# Patient Record
Sex: FEMALE | Race: WHITE | NOT HISPANIC OR LATINO | Employment: UNEMPLOYED | URBAN - METROPOLITAN AREA
[De-identification: names, ages, dates, MRNs, and addresses within clinical notes are randomized per-mention and may not be internally consistent; named-entity substitution may affect disease eponyms.]

---

## 2017-04-12 ENCOUNTER — CONVERSION ENCOUNTER (OUTPATIENT)
Dept: FAMILY MEDICINE CLINIC | Facility: CLINIC | Age: 52
End: 2017-04-12

## 2017-04-12 LAB
ALBUMIN SERPL-MCNC: 4.6 G/DL (ref 3.6–5.1)
ALBUMIN/GLOB SERPL: ABNORMAL {RATIO} (ref 1–2.5)
ALP SERPL-CCNC: 68 UNITS/L (ref 33–130)
ALT SERPL-CCNC: 12 UNITS/L (ref 6–29)
AST SERPL-CCNC: 14 UNITS/L (ref 10–35)
BACTERIA UR CULT: NORMAL
BASOPHILS # BLD AUTO: ABNORMAL 10*3/MM3 (ref 0–200)
BASOPHILS NFR BLD AUTO: 0.4 %
BILIRUB SERPL-MCNC: 0.5 MG/DL (ref 0.2–1.2)
BUN SERPL-MCNC: 12 MG/DL (ref 7–25)
BUN/CREAT SERPL: ABNORMAL (ref 6–22)
CALCIUM SERPL-MCNC: 9.7 MG/DL (ref 8.6–10.4)
CHLORIDE SERPL-SCNC: 101 MMOL/L (ref 98–110)
CHOLEST SERPL-MCNC: 170 MG/DL (ref 125–200)
CHOLEST/HDLC SERPL: ABNORMAL {RATIO}
CO2 CONTENT VENOUS: 27 MMOL/L (ref 20–31)
CONV NEUTROPHILS/100 LEUKOCYTES IN BODY FLUID BY MANUAL COUNT: 55.3 %
CONV TOTAL PROTEIN: 7.4 G/DL (ref 6.1–8.1)
CREAT UR-MCNC: 0.81 MG/DL (ref 0.5–1.05)
EOSINOPHIL # BLD AUTO: 2 %
EOSINOPHIL # BLD AUTO: ABNORMAL 10*3/MM3 (ref 15–500)
ERYTHROCYTE [DISTWIDTH] IN BLOOD BY AUTOMATED COUNT: 13.8 % (ref 11–15)
GLOBULIN UR ELPH-MCNC: ABNORMAL G/DL (ref 1.9–3.7)
GLUCOSE SERPL-MCNC: 91 MG/DL (ref 65–99)
HCT VFR BLD AUTO: 38.1 % (ref 35–45)
HDLC SERPL-MCNC: 79 MG/DL
HGB BLD-MCNC: 12.4 G/DL (ref 11.7–15.5)
LDLC SERPL CALC-MCNC: ABNORMAL MG/DL
LYMPHOCYTES # BLD AUTO: ABNORMAL 10*3/MM3 (ref 850–3900)
LYMPHOCYTES NFR BLD AUTO: 35.3 %
MCH RBC QN AUTO: 30.6 PG (ref 27–33)
MCHC RBC AUTO-ENTMCNC: ABNORMAL % (ref 32–36)
MCV RBC AUTO: 94.1 FL (ref 80–100)
MONOCYTES # BLD AUTO: ABNORMAL 10*3/MICROLITER (ref 200–950)
MONOCYTES NFR BLD AUTO: 7 %
NEUTROPHILS # BLD AUTO: ABNORMAL 10*3/MM3 (ref 1500–7800)
PLATELET # BLD AUTO: ABNORMAL 10*3/MM3 (ref 140–400)
PMV BLD AUTO: 7 FL (ref 7.5–12.5)
POTASSIUM SERPL-SCNC: 4.1 MMOL/L (ref 3.5–5.3)
RBC # BLD AUTO: ABNORMAL 10*6/MM3 (ref 3.8–5.1)
SODIUM SERPL-SCNC: 138 MMOL/L (ref 135–146)
TRIGL SERPL-MCNC: 64 MG/DL
TSH SERPL-ACNC: 2.35 MICROINTL UNITS/ML
WBC # BLD AUTO: ABNORMAL K/UL (ref 3.8–10.8)

## 2020-02-26 RX ORDER — AMLODIPINE BESYLATE 10 MG/1
TABLET ORAL
Qty: 30 TABLET | Refills: 8 | Status: SHIPPED | OUTPATIENT
Start: 2020-02-26 | End: 2020-11-24

## 2020-08-25 ENCOUNTER — OFFICE VISIT (OUTPATIENT)
Dept: FAMILY MEDICINE CLINIC | Facility: CLINIC | Age: 55
End: 2020-08-25

## 2020-08-25 ENCOUNTER — LAB (OUTPATIENT)
Dept: FAMILY MEDICINE CLINIC | Facility: CLINIC | Age: 55
End: 2020-08-25

## 2020-08-25 VITALS
RESPIRATION RATE: 10 BRPM | DIASTOLIC BLOOD PRESSURE: 97 MMHG | WEIGHT: 150.2 LBS | TEMPERATURE: 98.4 F | HEART RATE: 82 BPM | SYSTOLIC BLOOD PRESSURE: 155 MMHG

## 2020-08-25 DIAGNOSIS — R07.9 CHEST PAIN, UNSPECIFIED TYPE: ICD-10-CM

## 2020-08-25 DIAGNOSIS — E55.9 VITAMIN D DEFICIENCY: ICD-10-CM

## 2020-08-25 DIAGNOSIS — Z00.00 PHYSICAL EXAM: Primary | ICD-10-CM

## 2020-08-25 DIAGNOSIS — Z12.11 COLON CANCER SCREENING: ICD-10-CM

## 2020-08-25 DIAGNOSIS — Z60.9 HIGH RISK SOCIAL SITUATION: ICD-10-CM

## 2020-08-25 DIAGNOSIS — I10 ESSENTIAL HYPERTENSION: ICD-10-CM

## 2020-08-25 DIAGNOSIS — Z12.31 BREAST CANCER SCREENING BY MAMMOGRAM: ICD-10-CM

## 2020-08-25 DIAGNOSIS — Z20.828 EXPOSURE TO SARS-ASSOCIATED CORONAVIRUS: ICD-10-CM

## 2020-08-25 PROCEDURE — 87086 URINE CULTURE/COLONY COUNT: CPT | Performed by: FAMILY MEDICINE

## 2020-08-25 PROCEDURE — 82306 VITAMIN D 25 HYDROXY: CPT | Performed by: FAMILY MEDICINE

## 2020-08-25 PROCEDURE — 81001 URINALYSIS AUTO W/SCOPE: CPT | Performed by: FAMILY MEDICINE

## 2020-08-25 PROCEDURE — 99396 PREV VISIT EST AGE 40-64: CPT | Performed by: FAMILY MEDICINE

## 2020-08-25 PROCEDURE — 84443 ASSAY THYROID STIM HORMONE: CPT | Performed by: FAMILY MEDICINE

## 2020-08-25 PROCEDURE — 85025 COMPLETE CBC W/AUTO DIFF WBC: CPT | Performed by: FAMILY MEDICINE

## 2020-08-25 PROCEDURE — 80061 LIPID PANEL: CPT | Performed by: FAMILY MEDICINE

## 2020-08-25 PROCEDURE — 86769 SARS-COV-2 COVID-19 ANTIBODY: CPT | Performed by: FAMILY MEDICINE

## 2020-08-25 PROCEDURE — 80053 COMPREHEN METABOLIC PANEL: CPT | Performed by: FAMILY MEDICINE

## 2020-08-25 PROCEDURE — 86803 HEPATITIS C AB TEST: CPT | Performed by: FAMILY MEDICINE

## 2020-08-25 PROCEDURE — 93000 ELECTROCARDIOGRAM COMPLETE: CPT | Performed by: FAMILY MEDICINE

## 2020-08-25 RX ORDER — OMEPRAZOLE 20 MG/1
TABLET, DELAYED RELEASE ORAL
COMMUNITY
Start: 2017-04-10 | End: 2021-08-26 | Stop reason: ALTCHOICE

## 2020-08-25 RX ORDER — LOSARTAN POTASSIUM 25 MG/1
25 TABLET ORAL DAILY
Qty: 30 TABLET | Refills: 11 | Status: SHIPPED | OUTPATIENT
Start: 2020-08-25 | End: 2021-07-30 | Stop reason: SDUPTHER

## 2020-08-25 SDOH — SOCIAL STABILITY - SOCIAL INSECURITY: PROBLEM RELATED TO SOCIAL ENVIRONMENT, UNSPECIFIED: Z60.9

## 2020-08-25 NOTE — PROGRESS NOTES
Rooming Tab(CC,VS,Pt Hx,Fall Screen)  Chief Complaint   Patient presents with   • Annual Exam       Subjective 55-year-old here for physical.  The patient does not exercise and but she has active.  She is complaining of some random type chest pain worse when she sleeps on the left side and she will also get pain along the medial border of her left scapula.  When she gets her chest discomfort is on both sides, vague pain with more of a discomfort with no associated shortness of breath or nausea or vomiting or diaphoresis.  Not associated with exertion.  It is in goes away after 30 to 60 seconds.  Does not really seem to hurt more with deep breathing.  It is not going to her arm or neck.  This is been going on for several months she does have a history of hypertension.  She does not smoke and she does not have diabetes or excessively high cholesterol.  Family history is negative for heart disease at a younger age.   Patient tries to eat a nutritious diet.  She tries to avoid fast food and soft drinks.  She denies any issues with her bowel or bladder function.  She has no blood in her stool or urine.  Her blood pressure has been running somewhat high recently at home  Patient would like to be checked for COVID-19 because she felt like she was sick few months ago with COVID before it was known to be in this area    I have reviewed and updated her medications, medical history and problem list during today's office visit.     Patient Care Team:  Osman Jean Baptiste MD as PCP - General (Family Medicine)    Problem List Tab  Medications Tab  Synopsis Tab  Chart Review Tab  Care Everywhere Tab  Immunizations Tab  Patient History Tab    Social History     Tobacco Use   • Smoking status: Never Smoker   • Smokeless tobacco: Never Used   Substance Use Topics   • Alcohol use: Yes     Frequency: 2-3 times a week     Drinks per session: 3 or 4     Binge frequency: Monthly     Comment: NIGHTLY       Review of Systems   Constitutional:  Negative for chills, fatigue and fever.   HENT: Negative for nosebleeds.    Eyes: Negative for double vision.   Respiratory: Negative for chest tightness and shortness of breath.    Cardiovascular: Positive for chest pain. Negative for palpitations.   Gastrointestinal: Negative for blood in stool.   Genitourinary: Negative for dysuria and hematuria.   Musculoskeletal: Positive for back pain.   Neurological: Negative for dizziness and syncope.   Psychiatric/Behavioral: Negative for depressed mood.       Objective     Rooming Tab(CC,VS,Pt Hx,Fall Screen)  /97   Pulse 82   Temp 98.4 °F (36.9 °C)   Resp 10   Wt 68.1 kg (150 lb 3.2 oz)     There is no height or weight on file to calculate BMI.    Physical Exam   Constitutional: She is oriented to person, place, and time. She appears well-developed and well-nourished. No distress.   HENT:   Head: Normocephalic and atraumatic.   Nose: Nose normal.   Mouth/Throat: Oropharynx is clear and moist.   Eyes: Pupils are equal, round, and reactive to light. Conjunctivae, EOM and lids are normal.   Neck: Trachea normal and normal range of motion. Neck supple. No JVD present. Carotid bruit is not present. No thyroid mass and no thyromegaly present.   No carotid bruits   Cardiovascular: Normal rate, regular rhythm, normal heart sounds and intact distal pulses.   Pulmonary/Chest: Effort normal and breath sounds normal.   Abdominal: Soft. Bowel sounds are normal. She exhibits no distension. There is no tenderness. There is no guarding.   Genitourinary:   Genitourinary Comments: Breast exam normal   Musculoskeletal:   No c/c/e   Neurological: She is alert and oriented to person, place, and time. No cranial nerve deficit.   Skin: Skin is warm and dry.   She has a chronic area of hyperpigmentation on her right upper quadrant of her abdomen   Psychiatric: She has a normal mood and affect. Her speech is normal and behavior is normal. She is attentive.   Nursing note and vitals  reviewed.       Statin Choice Calculator  Data Reviewed:        ECG 12 Lead  Date/Time: 8/25/2020 2:19 PM  Performed by: Osman Jaen Baptiste MD  Authorized by: Osman Jean Baptiste MD   Comparison: not compared with previous ECG   Previous ECG: no previous ECG available  Rhythm: sinus rhythm  Rate: normal  Conduction: conduction normal  ST Segments: ST segments normal  T Waves: T waves normal  QRS axis: normal  Other: no other findings    Clinical impression: normal ECG                    Lab Results   Component Value Date    BUN 9 08/25/2020    CREATININE 0.76 08/25/2020    EGFRIFNONA 79 08/25/2020     08/25/2020    K 3.8 08/25/2020    CL 99 08/25/2020    CALCIUM 10.1 08/25/2020    ALBUMIN 5.00 08/25/2020    BILITOT 0.3 08/25/2020    ALKPHOS 69 08/25/2020    AST 22 08/25/2020    ALT 21 08/25/2020    TRIG 118 08/25/2020    HDL 69 (H) 08/25/2020    VLDL 23.6 08/25/2020    LDL 92 08/25/2020    LDLHDL 1.34 08/25/2020    WBC 7.19 08/25/2020    RBC 4.48 08/25/2020    HCT 42.3 08/25/2020    MCV 94.4 08/25/2020    MCH 31.7 08/25/2020    TSH 2.620 08/25/2020    EBQK63XR 32.0 08/25/2020      Assessment/Plan   Order Review Tab  Health Maintenance Tab  Patient Plan/Order Tab  Diagnoses and all orders for this visit:    1. Physical exam (Primary)  Assessment & Plan:  Recommend routine aerobic exercise for cardiovascular health once we clear her heart.  She also could consider muscle toning and weightbearing exercise for bone strength.  Nutritious diet high in fiber and vegetable, low carbohydrate, eating lean cuts of meat higher amounts of fish.  Immunizations were discussed with the patient.    Orders:  -     CBC & Differential  -     Comprehensive Metabolic Panel  -     Lipid Panel  -     TSH  -     Urinalysis With Culture If Indicated - Urine, Clean Catch  -     CBC Auto Differential  -     Urinalysis, Microscopic Only - Urine, Clean Catch; Future  -     Urinalysis, Microscopic Only - Urine, Clean Catch  -     Urine Culture  - Urine,; Future  -     Urine Culture - Urine, Urine, Clean Catch    2. Breast cancer screening by mammogram  -     Mammo Screening Digital Tomosynthesis Bilateral With CAD; Future    3. Colon cancer screening  -     Ambulatory Referral For Screening Colonoscopy    4. Chest pain, unspecified type  Assessment & Plan:  Does not sound cardiac however it is somewhat vague.  We will go ahead and do a chest x-ray and an EKG and a stress treadmill.  I think some of her discomfort in her medial border of her left scapula is related to her pole of her large breast that can be causing some of her chest discomfort as well.  We discussed ways to try to improve this    Orders:  -     ECG 12 Lead  -     XR Chest PA & Lateral; Future  -     Treadmill Stress Test; Future    5. Vitamin D deficiency  -     Vitamin D 25 Hydroxy    6. High risk social situation  -     Hepatitis C Antibody    7. Exposure to SARS-associated coronavirus  -     SARS-CoV-2 Antibodies (Roche)    8. Essential hypertension  Assessment & Plan:  Hypertension is worsening.  Dietary sodium restriction.  Weight loss.  Regular aerobic exercise.  Medication changes per orders.  Blood pressure will be reassessed at the next regular appointment.  Add losartan 25 mg daily      Other orders  -     losartan (Cozaar) 25 MG tablet; Take 1 tablet by mouth Daily.  Dispense: 30 tablet; Refill: 11      Wrapup Tab  Return in about 6 weeks (around 10/6/2020) for Recheck.

## 2020-08-26 PROBLEM — R07.9 CHEST PAIN: Status: ACTIVE | Noted: 2020-08-26

## 2020-08-26 LAB
25(OH)D3 SERPL-MCNC: 32 NG/ML (ref 30–100)
ALBUMIN SERPL-MCNC: 5 G/DL (ref 3.5–5.2)
ALBUMIN/GLOB SERPL: 1.7 G/DL
ALP SERPL-CCNC: 69 U/L (ref 39–117)
ALT SERPL W P-5'-P-CCNC: 21 U/L (ref 1–33)
ANION GAP SERPL CALCULATED.3IONS-SCNC: 13.1 MMOL/L (ref 5–15)
AST SERPL-CCNC: 22 U/L (ref 1–32)
BACTERIA UR QL AUTO: ABNORMAL /HPF
BASOPHILS # BLD AUTO: 0.05 10*3/MM3 (ref 0–0.2)
BASOPHILS NFR BLD AUTO: 0.7 % (ref 0–1.5)
BILIRUB SERPL-MCNC: 0.3 MG/DL (ref 0–1.2)
BILIRUB UR QL STRIP: NEGATIVE
BUN SERPL-MCNC: 9 MG/DL (ref 6–20)
BUN/CREAT SERPL: 11.8 (ref 7–25)
CALCIUM SPEC-SCNC: 10.1 MG/DL (ref 8.6–10.5)
CHLORIDE SERPL-SCNC: 99 MMOL/L (ref 98–107)
CHOLEST SERPL-MCNC: 185 MG/DL (ref 0–200)
CLARITY UR: ABNORMAL
CO2 SERPL-SCNC: 24.9 MMOL/L (ref 22–29)
COLOR UR: YELLOW
CREAT SERPL-MCNC: 0.76 MG/DL (ref 0.57–1)
DEPRECATED RDW RBC AUTO: 46 FL (ref 37–54)
EOSINOPHIL # BLD AUTO: 0.06 10*3/MM3 (ref 0–0.4)
EOSINOPHIL NFR BLD AUTO: 0.8 % (ref 0.3–6.2)
ERYTHROCYTE [DISTWIDTH] IN BLOOD BY AUTOMATED COUNT: 13.2 % (ref 12.3–15.4)
GFR SERPL CREATININE-BSD FRML MDRD: 79 ML/MIN/1.73
GLOBULIN UR ELPH-MCNC: 2.9 GM/DL
GLUCOSE SERPL-MCNC: 88 MG/DL (ref 65–99)
GLUCOSE UR STRIP-MCNC: NEGATIVE MG/DL
HCT VFR BLD AUTO: 42.3 % (ref 34–46.6)
HCV AB SER DONR QL: NORMAL
HDLC SERPL-MCNC: 69 MG/DL (ref 40–60)
HGB BLD-MCNC: 14.2 G/DL (ref 12–15.9)
HGB UR QL STRIP.AUTO: NEGATIVE
HYALINE CASTS UR QL AUTO: ABNORMAL /LPF
IMM GRANULOCYTES # BLD AUTO: 0.02 10*3/MM3 (ref 0–0.05)
IMM GRANULOCYTES NFR BLD AUTO: 0.3 % (ref 0–0.5)
KETONES UR QL STRIP: NEGATIVE
LDLC SERPL CALC-MCNC: 92 MG/DL (ref 0–100)
LDLC/HDLC SERPL: 1.34 {RATIO}
LEUKOCYTE ESTERASE UR QL STRIP.AUTO: ABNORMAL
LYMPHOCYTES # BLD AUTO: 3.51 10*3/MM3 (ref 0.7–3.1)
LYMPHOCYTES NFR BLD AUTO: 48.8 % (ref 19.6–45.3)
MCH RBC QN AUTO: 31.7 PG (ref 26.6–33)
MCHC RBC AUTO-ENTMCNC: 33.6 G/DL (ref 31.5–35.7)
MCV RBC AUTO: 94.4 FL (ref 79–97)
MONOCYTES # BLD AUTO: 0.54 10*3/MM3 (ref 0.1–0.9)
MONOCYTES NFR BLD AUTO: 7.5 % (ref 5–12)
NEUTROPHILS NFR BLD AUTO: 3.01 10*3/MM3 (ref 1.7–7)
NEUTROPHILS NFR BLD AUTO: 41.9 % (ref 42.7–76)
NITRITE UR QL STRIP: NEGATIVE
NRBC BLD AUTO-RTO: 0 /100 WBC (ref 0–0.2)
PH UR STRIP.AUTO: 6.5 [PH] (ref 5–8)
PLATELET # BLD AUTO: 386 10*3/MM3 (ref 140–450)
PMV BLD AUTO: 8.9 FL (ref 6–12)
POTASSIUM SERPL-SCNC: 3.8 MMOL/L (ref 3.5–5.2)
PROT SERPL-MCNC: 7.9 G/DL (ref 6–8.5)
PROT UR QL STRIP: NEGATIVE
RBC # BLD AUTO: 4.48 10*6/MM3 (ref 3.77–5.28)
RBC # UR: ABNORMAL /HPF
REF LAB TEST METHOD: ABNORMAL
SODIUM SERPL-SCNC: 137 MMOL/L (ref 136–145)
SP GR UR STRIP: <=1.005 (ref 1–1.03)
SQUAMOUS #/AREA URNS HPF: ABNORMAL /HPF
TRIGL SERPL-MCNC: 118 MG/DL (ref 0–150)
TSH SERPL DL<=0.05 MIU/L-ACNC: 2.62 UIU/ML (ref 0.27–4.2)
UROBILINOGEN UR QL STRIP: ABNORMAL
VLDLC SERPL-MCNC: 23.6 MG/DL (ref 5–40)
WBC # BLD AUTO: 7.19 10*3/MM3 (ref 3.4–10.8)
WBC UR QL AUTO: ABNORMAL /HPF

## 2020-08-26 NOTE — ASSESSMENT & PLAN NOTE
Recommend routine aerobic exercise for cardiovascular health once we clear her heart.  She also could consider muscle toning and weightbearing exercise for bone strength.  Nutritious diet high in fiber and vegetable, low carbohydrate, eating lean cuts of meat higher amounts of fish.  Immunizations were discussed with the patient.

## 2020-08-26 NOTE — ASSESSMENT & PLAN NOTE
Hypertension is worsening.  Dietary sodium restriction.  Weight loss.  Regular aerobic exercise.  Medication changes per orders.  Blood pressure will be reassessed at the next regular appointment.  Add losartan 25 mg daily

## 2020-08-26 NOTE — ASSESSMENT & PLAN NOTE
Does not sound cardiac however it is somewhat vague.  We will go ahead and do a chest x-ray and an EKG and a stress treadmill.  I think some of her discomfort in her medial border of her left scapula is related to her pole of her large breast that can be causing some of her chest discomfort as well.  We discussed ways to try to improve this   Alert-The patient is alert, awake and responds to voice. The patient is oriented to time, place, and person. The triage nurse is able to obtain subjective information.

## 2020-08-27 LAB
BACTERIA SPEC AEROBE CULT: NORMAL
SARS-COV-2 AB SERPL QL IA: NEGATIVE

## 2020-08-31 ENCOUNTER — HOSPITAL ENCOUNTER (OUTPATIENT)
Dept: RESPIRATORY THERAPY | Facility: HOSPITAL | Age: 55
Discharge: HOME | End: 2020-08-31
Admitting: FAMILY MEDICINE

## 2020-08-31 DIAGNOSIS — R07.9 CHEST PAIN, UNSPECIFIED TYPE: ICD-10-CM

## 2020-08-31 DIAGNOSIS — R07.9 CHEST PAIN, UNSPECIFIED TYPE: Primary | ICD-10-CM

## 2020-08-31 DIAGNOSIS — Z12.31 BREAST CANCER SCREENING BY MAMMOGRAM: ICD-10-CM

## 2020-08-31 LAB
BH CV STRESS BP STAGE 1: NORMAL
BH CV STRESS BP STAGE 2: NORMAL
BH CV STRESS BP STAGE 3: NORMAL
BH CV STRESS DURATION MIN STAGE 1: 3
BH CV STRESS DURATION MIN STAGE 2: 3
BH CV STRESS DURATION MIN STAGE 3: 3
BH CV STRESS DURATION SEC STAGE 1: 0
BH CV STRESS DURATION SEC STAGE 2: 0
BH CV STRESS DURATION SEC STAGE 3: 0
BH CV STRESS GRADE STAGE 1: 10
BH CV STRESS GRADE STAGE 2: 12
BH CV STRESS GRADE STAGE 3: 14
BH CV STRESS HR STAGE 1: 121
BH CV STRESS HR STAGE 2: 152
BH CV STRESS HR STAGE 3: 160
BH CV STRESS METS STAGE 1: 4.6
BH CV STRESS METS STAGE 2: 7
BH CV STRESS METS STAGE 3: 10.1
BH CV STRESS PROTOCOL 1: NORMAL
BH CV STRESS RECOVERY BP: NORMAL MMHG
BH CV STRESS RECOVERY HR: 98 BPM
BH CV STRESS SPEED STAGE 1: 1.7
BH CV STRESS SPEED STAGE 2: 2.5
BH CV STRESS SPEED STAGE 3: 3.4
BH CV STRESS STAGE 1: 1
BH CV STRESS STAGE 2: 2
BH CV STRESS STAGE 3: 3
MAXIMAL PREDICTED HEART RATE: 164 BPM
PERCENT MAX PREDICTED HR: 97.56 %
STRESS BASELINE BP: NORMAL MMHG
STRESS BASELINE HR: 76 BPM
STRESS PERCENT HR: 115 %
STRESS POST ESTIMATED WORKLOAD: 10.1 METS
STRESS POST EXERCISE DUR MIN: 7 MIN
STRESS POST PEAK BP: NORMAL MMHG
STRESS POST PEAK HR: 160 BPM
STRESS TARGET HR: 139 BPM

## 2020-08-31 PROCEDURE — 93018 CV STRESS TEST I&R ONLY: CPT | Performed by: NURSE PRACTITIONER

## 2020-08-31 PROCEDURE — 93017 CV STRESS TEST TRACING ONLY: CPT

## 2020-09-17 DIAGNOSIS — R07.9 CHEST PAIN, UNSPECIFIED TYPE: ICD-10-CM

## 2020-09-23 DIAGNOSIS — R16.0 LIVER MASSES: Primary | ICD-10-CM

## 2020-10-02 DIAGNOSIS — R16.0 LIVER MASSES: ICD-10-CM

## 2020-10-02 DIAGNOSIS — M54.12 CERVICAL RADICULOPATHY: Primary | ICD-10-CM

## 2020-10-13 ENCOUNTER — TREATMENT (OUTPATIENT)
Dept: PHYSICAL THERAPY | Facility: CLINIC | Age: 55
End: 2020-10-13

## 2020-10-13 DIAGNOSIS — M54.12 RADICULOPATHY, CERVICAL: ICD-10-CM

## 2020-10-13 DIAGNOSIS — M54.6 THORACIC SPINE PAIN: Primary | ICD-10-CM

## 2020-10-13 PROCEDURE — 97161 PT EVAL LOW COMPLEX 20 MIN: CPT | Performed by: PHYSICAL THERAPIST

## 2020-10-13 PROCEDURE — 97140 MANUAL THERAPY 1/> REGIONS: CPT | Performed by: PHYSICAL THERAPIST

## 2020-10-13 PROCEDURE — 97110 THERAPEUTIC EXERCISES: CPT | Performed by: PHYSICAL THERAPIST

## 2020-10-13 NOTE — PROGRESS NOTES
"Physical Therapy Initial Evaluation and Plan of Care    Patient: Latonya Lowe   : 1965  Diagnosis/ICD-10 Code:  Low back pain, unspecified back pain laterality, unspecified chronicity, unspecified whether sciatica present [M54.5]  Referring practitioner: Osman Jean Baptiste MD  Date of Initial Visit: 10/13/2020  Today's Date: 10/13/2020  Patient seen for 1 sessions           Subjective Questionnaire:  NDI 8%      Subjective 54 yo female with c/o L parascapular pain. Pt reports long history of pain with insidious onset. Symptoms did not improve with time. Pt with episodes of global L UE numbness/tingling. Symptoms worsen when lying supine and \"pushing\" movements. Symptoms improve with movement. Further alleviating and exacerbating factors are unknown. 1/10 pain now and 5-6/10 at worst.  MD follow up: 10/21/20  Social hx: Unemployed      Objective          Active Range of Motion   Cervical/Thoracic Spine   Cervical    Flexion: WFL  Extension: WFL  Left lateral flexion: WFL  Right lateral flexion: WFL  Left rotation: with pain  Right rotation: WFL    Additional Active Range of Motion Details  Moderately limited with left rotation.    UEs wfl.    Strength/Myotome Testing   Cervical Spine     Left   Normal strength    Right   Normal strength    Left Shoulder     Isolated Muscles   Serratus anterior: 4-     Additional Strength Details  Pain provocation with serratus anterior MMT on the left.    Tests   Cervical     Left   Positive cervical distraction and Spurling's sign.       Further provocative testing is negative.     Assessment & Plan     Assessment  Impairments: abnormal or restricted ROM, activity intolerance, impaired physical strength, lacks appropriate home exercise program and pain with function  Assessment details: 54 yo female with c/o L parascapular pain. Pt is with a good response to treatment this date and would benefit from further evaluation and treatment to address the above " impairments.    Prognosis: good  Functional Limitations: lifting, pulling, pushing, uncomfortable because of pain, moving in bed and unable to perform repetitive tasks  Goals  Plan Goals: Short term goals, 1 week: Tolerate HEP progression.  Voice compliance with activity modification.  Report improvement in symptoms.    LTGs, 6 weeks: Cspine AROM to be wfl and non provocative.  Symptoms to be centralized.  5/5 SA strength and without pain provocation with testing.  Improve NDI to 2%.  Report no to little difficulty lying supine.   Independent with HEP.    Plan  Therapy options: will be seen for skilled physical therapy services  Other planned modality interventions: modalities prn  Planned therapy interventions: flexibility, body mechanics training, functional ROM exercises, home exercise program, manual therapy, neuromuscular re-education, postural training, strengthening, stretching and therapeutic activities  Frequency: 1-2 times per week.  Duration in visits: 10  Treatment plan discussed with: patient      Timed:         Manual Therapy:    15     mins  58240;     Therapeutic Exercise:    10     mins  39265;     Neuromuscular Sarah Beth:        mins  17688;    Therapeutic Activity:          mins  55475;     Gait Training:           mins  91297;     Ultrasound:          mins  71111;    Ionto                                   mins   19612  Self Care                            mins   59228    Un-Timed:  Electrical Stimulation:         mins  90160 ( );  Traction          mins 79498  Low Eval     15     Mins  97203  Mod Eval          Mins  59050  High Eval                            Mins  36420  Re-Eval                               mins  13105        Timed Treatment:   25   mins   Total Treatment:     40   mins    PT SIGNATURE: Johnson Buenrostro PT, DPT, OCS  [place] license: [id number]  DATE TREATMENT INITIATED: 10/13/2020    Initial Certification  Certification Period: 1/11/2021  I certify that the therapy services are  furnished while this patient is under my care.  The services outlined above are required for this patient and will be reviewed every 90 days.     PHYSICIAN: _____________________________    Osman Jean Baptiste MD      DATE:     Please sign and return via fax to [phone]. Thank you, [facility].

## 2020-10-20 ENCOUNTER — TREATMENT (OUTPATIENT)
Dept: PHYSICAL THERAPY | Facility: CLINIC | Age: 55
End: 2020-10-20

## 2020-10-20 DIAGNOSIS — M54.12 RADICULOPATHY, CERVICAL: Primary | ICD-10-CM

## 2020-10-20 DIAGNOSIS — M54.6 THORACIC SPINE PAIN: ICD-10-CM

## 2020-10-20 PROCEDURE — 97110 THERAPEUTIC EXERCISES: CPT | Performed by: PHYSICAL THERAPIST

## 2020-10-20 PROCEDURE — 97012 MECHANICAL TRACTION THERAPY: CPT | Performed by: PHYSICAL THERAPIST

## 2020-10-20 NOTE — PROGRESS NOTES
Physical Therapy Daily Progress Note  Latonya Lowe   1965   Primary Diagnosis: Radiculopathy, cervical [M54.12]   Type: THERAPY  Noted: 10/13/2020   10/20/2020   Visits: 2       Subjective   Pt reports: Pain intensity improving but still fairly consistent. HEP going well.      Objective     See Exercise, Manual, and Modality Logs for complete treatment.     Patient Education: HEP    Assessment/Plan Tolerating progression well.       Progress per Plan of Care            Timed:         Manual Therapy:         mins  67465;     Therapeutic Exercise:    23     mins  95578;     Neuromuscular Sarah Beth:        mins  10856;    Therapeutic Activity:          mins  62311;     Gait Training:           mins  61364;     Ultrasound:          mins  44369;    Ionto                                   mins   20787  Self Care                            mins   73101    Un-Timed:  Electrical Stimulation:         mins  99524 ( );  Traction     15     mins 78055  Low Eval          Mins  36474  Mod Eval          Mins  01402  High Eval                            Mins  31816  Re-Eval                               mins  36455    Timed Treatment:   23   mins   Total Treatment:     38   mins    Johnson Buenrostro, PT, DPT, OCS  [place] license: [id number]  Physical Therapist

## 2020-10-21 ENCOUNTER — OFFICE VISIT (OUTPATIENT)
Dept: FAMILY MEDICINE CLINIC | Facility: CLINIC | Age: 55
End: 2020-10-21

## 2020-10-21 VITALS
SYSTOLIC BLOOD PRESSURE: 118 MMHG | OXYGEN SATURATION: 94 % | DIASTOLIC BLOOD PRESSURE: 80 MMHG | WEIGHT: 148 LBS | TEMPERATURE: 96.8 F | RESPIRATION RATE: 16 BRPM | HEART RATE: 82 BPM

## 2020-10-21 DIAGNOSIS — M54.6 ACUTE LEFT-SIDED THORACIC BACK PAIN: ICD-10-CM

## 2020-10-21 DIAGNOSIS — I10 ESSENTIAL HYPERTENSION: Primary | ICD-10-CM

## 2020-10-21 PROCEDURE — 99213 OFFICE O/P EST LOW 20 MIN: CPT | Performed by: FAMILY MEDICINE

## 2020-10-21 NOTE — PROGRESS NOTES
Rooming Tab(CC,VS,Pt Hx,Fall Screen)  Chief Complaint   Patient presents with   • Hypertension       Subjective Patient is here for follow-up of her hypertension.  She has had normal blood pressure.  We added losartan 25 mg daily because of her blood pressure being elevated she is tolerating it.  She has no dizziness or syncope or chest pain.  Patient is getting physical therapy for left-sided back pain and is helping.  She had a CT scan of the chest which was negative but had to have an MRI of her abdomen for questionable liver lesion which ended up being negative as well.  She is feeling well overall.    I have reviewed and updated her medications, medical history and problem list during today's office visit.     Patient Care Team:  Osman Jean Baptiste MD as PCP - General (Family Medicine)    Problem List Tab  Medications Tab  Synopsis Tab  Chart Review Tab  Care Everywhere Tab  Immunizations Tab  Patient History Tab    Social History     Tobacco Use   • Smoking status: Never Smoker   • Smokeless tobacco: Never Used   Substance Use Topics   • Alcohol use: Yes     Frequency: 2-3 times a week     Drinks per session: 3 or 4     Binge frequency: Monthly     Comment: NIGHTLY       Review of Systems   Constitutional: Negative for chills, fatigue and fever.   HENT: Negative for congestion and nosebleeds.    Eyes: Negative for double vision.   Respiratory: Negative for chest tightness and shortness of breath.    Cardiovascular: Negative for chest pain and palpitations.   Gastrointestinal: Negative for blood in stool.   Genitourinary: Negative for dysuria and hematuria.   Musculoskeletal: Positive for back pain.   Neurological: Negative for dizziness and syncope.   Psychiatric/Behavioral: Negative for depressed mood.       Objective     Rooming Tab(CC,VS,Pt Hx,Fall Screen)  /80 (BP Location: Right arm)   Pulse 82   Temp 96.8 °F (36 °C)   Resp 16   Wt 67.1 kg (148 lb)   SpO2 94%     There is no height or weight on  file to calculate BMI.    Physical Exam  Vitals signs and nursing note reviewed.   Constitutional:       General: She is not in acute distress.     Appearance: She is well-developed.   HENT:      Head: Normocephalic and atraumatic.      Right Ear: Tympanic membrane and ear canal normal.      Left Ear: Tympanic membrane and ear canal normal.      Nose: Nose normal.      Mouth/Throat:      Mouth: Mucous membranes are moist.      Pharynx: Oropharynx is clear.   Eyes:      General: Lids are normal.      Extraocular Movements: Extraocular movements intact.      Conjunctiva/sclera: Conjunctivae normal.      Pupils: Pupils are equal, round, and reactive to light.   Neck:      Musculoskeletal: Normal range of motion and neck supple.      Thyroid: No thyroid mass or thyromegaly.      Vascular: No carotid bruit or JVD.      Trachea: Trachea normal.      Comments: No carotid bruits  Cardiovascular:      Rate and Rhythm: Normal rate and regular rhythm.      Heart sounds: Normal heart sounds.   Pulmonary:      Effort: Pulmonary effort is normal.      Breath sounds: Normal breath sounds.   Musculoskeletal:      Comments: No c/c/e   Skin:     General: Skin is warm and dry.   Neurological:      Mental Status: She is alert and oriented to person, place, and time.      Cranial Nerves: No cranial nerve deficit.   Psychiatric:         Attention and Perception: She is attentive.         Speech: Speech normal.         Behavior: Behavior normal.          Statin Choice Calculator  Data Reviewed:               Lab Results   Component Value Date    BUN 9 08/25/2020    CREATININE 0.76 08/25/2020    EGFRIFNONA 79 08/25/2020     08/25/2020    K 3.8 08/25/2020    CL 99 08/25/2020    CALCIUM 10.1 08/25/2020    ALBUMIN 5.00 08/25/2020    BILITOT 0.3 08/25/2020    ALKPHOS 69 08/25/2020    AST 22 08/25/2020    ALT 21 08/25/2020    TRIG 118 08/25/2020    HDL 69 (H) 08/25/2020    VLDL 23.6 08/25/2020    LDL 92 08/25/2020    LDLHDL 1.34  08/25/2020    WBC 7.19 08/25/2020    RBC 4.48 08/25/2020    HCT 42.3 08/25/2020    MCV 94.4 08/25/2020    MCH 31.7 08/25/2020    TSH 2.620 08/25/2020    HKEF13GB 32.0 08/25/2020      Assessment/Plan   Order Review Tab  Health Maintenance Tab  Patient Plan/Order Tab  Diagnoses and all orders for this visit:    1. Essential hypertension (Primary)  Assessment & Plan:  Hypertension is improving with treatment.  Continue current treatment regimen.  Dietary sodium restriction.  Regular aerobic exercise.  Continue current medications.  Blood pressure will be reassessed at the next regular appointment.      2. Acute left-sided thoracic back pain  Assessment & Plan:  Continue physical therapy.  Seems to be improving.        Wrapup Tab  Return in about 44 weeks (around 8/25/2021) for Annual physical.

## 2020-10-22 ENCOUNTER — TREATMENT (OUTPATIENT)
Dept: PHYSICAL THERAPY | Facility: CLINIC | Age: 55
End: 2020-10-22

## 2020-10-22 DIAGNOSIS — M54.6 THORACIC SPINE PAIN: ICD-10-CM

## 2020-10-22 DIAGNOSIS — M54.12 RADICULOPATHY, CERVICAL: Primary | ICD-10-CM

## 2020-10-22 PROCEDURE — 97110 THERAPEUTIC EXERCISES: CPT | Performed by: PHYSICAL THERAPIST

## 2020-10-22 PROCEDURE — 97012 MECHANICAL TRACTION THERAPY: CPT | Performed by: PHYSICAL THERAPIST

## 2020-10-22 NOTE — PROGRESS NOTES
Physical Therapy Daily Progress Note  Latonya Lowe   1965   Primary Diagnosis: Radiculopathy, cervical [M54.12]   Type: THERAPY  Noted: 10/13/2020   10/22/2020   Visits: 3       Subjective   Pt reports: Scapular pain improving. HEP going well      Objective     See Exercise, Manual, and Modality Logs for complete treatment.     Patient Education: HEP    Assessment/Plan Symptoms continue to centralize.       Progress per Plan of Care            Timed:         Manual Therapy:         mins  09893;     Therapeutic Exercise:    10     mins  61868;     Neuromuscular Sarah Beth:        mins  79052;    Therapeutic Activity:          mins  19324;     Gait Training:           mins  73867;     Ultrasound:          mins  15783;    Ionto                                   mins   22987  Self Care                            mins   63938    Un-Timed:  Electrical Stimulation:         mins  92958 ( );  Traction     15     mins 46565  Low Eval          Mins  48503  Mod Eval          Mins  37641  High Eval                            Mins  24054  Re-Eval                               mins  30648    Timed Treatment:   10   mins   Total Treatment:     25   mins    Johnson Buenrostro, PT, DPT, OCS  [place] license: [id number]  Physical Therapist

## 2020-10-23 PROBLEM — M54.6 ACUTE LEFT-SIDED THORACIC BACK PAIN: Status: ACTIVE | Noted: 2020-10-23

## 2020-10-27 ENCOUNTER — TREATMENT (OUTPATIENT)
Dept: PHYSICAL THERAPY | Facility: CLINIC | Age: 55
End: 2020-10-27

## 2020-10-27 DIAGNOSIS — M54.12 RADICULOPATHY, CERVICAL: Primary | ICD-10-CM

## 2020-10-27 DIAGNOSIS — M54.6 THORACIC SPINE PAIN: ICD-10-CM

## 2020-10-27 PROCEDURE — 97012 MECHANICAL TRACTION THERAPY: CPT | Performed by: PHYSICAL THERAPIST

## 2020-10-27 NOTE — PROGRESS NOTES
Physical Therapy Daily Progress Note  Latonya Lowe   1965   Primary Diagnosis: Radiculopathy, cervical [M54.12]   Type: THERAPY  Noted: 10/13/2020   10/27/2020   Visits: 4       Subjective   Pt reports: Scapula pain improving. HEP going well.      Objective     See Exercise, Manual, and Modality Logs for complete treatment.     Patient Education: HEP    Assessment/Plan Symptoms continuing to improve. Doing well with HEP.      Progress per Plan of Care            Timed:         Manual Therapy:         mins  20845;     Therapeutic Exercise:         mins  01136;     Neuromuscular Sarah Beth:        mins  41696;    Therapeutic Activity:          mins  21071;     Gait Training:           mins  38339;     Ultrasound:          mins  75627;    Ionto                                   mins   58019  Self Care                            mins   40901    Un-Timed:  Electrical Stimulation:         mins  31613 ( );  Traction      15    mins 97330  Low Eval          Mins  37189  Mod Eval          Mins  88292  High Eval                            Mins  00359  Re-Eval                               mins  50132    Timed Treatment:   0   mins   Total Treatment:     15   mins    Johnson Buenrostro, PT, DPT, OCS  [place] license: [id number]  Physical Therapist

## 2020-10-29 ENCOUNTER — TREATMENT (OUTPATIENT)
Dept: PHYSICAL THERAPY | Facility: CLINIC | Age: 55
End: 2020-10-29

## 2020-10-29 DIAGNOSIS — M54.6 THORACIC SPINE PAIN: ICD-10-CM

## 2020-10-29 DIAGNOSIS — M54.12 RADICULOPATHY, CERVICAL: Primary | ICD-10-CM

## 2020-10-29 PROCEDURE — 97012 MECHANICAL TRACTION THERAPY: CPT | Performed by: PHYSICAL THERAPIST

## 2020-10-29 PROCEDURE — 97110 THERAPEUTIC EXERCISES: CPT | Performed by: PHYSICAL THERAPIST

## 2020-10-29 NOTE — PROGRESS NOTES
Physical Therapy Daily Progress Note  Latonya Lowe   1965   Primary Diagnosis: Radiculopathy, cervical [M54.12]   Type: THERAPY  Noted: 10/13/2020   10/29/2020   Visits: 5       Subjective   Pt reports: Pt feeling near prior level of function. HEP going well. Pt wants to try independent HEP before proceeding with more treatment here.      Objective     See Exercise, Manual, and Modality Logs for complete treatment.     Patient Education: HEP    Assessment/Plan Pt to continue with 2-3 weeks independent HEP then return here if necessary. Will DC if this goes well.      Progress per Plan of Care            Timed:         Manual Therapy:         mins  72160;     Therapeutic Exercise:    8     mins  49336;     Neuromuscular Sarah Beth:        mins  50996;    Therapeutic Activity:          mins  09307;     Gait Training:           mins  08494;     Ultrasound:          mins  60229;    Ionto                                   mins   62731  Self Care                            mins   04393    Un-Timed:  Electrical Stimulation:         mins  56711 ( );  Traction     15     mins 47513  Low Eval          Mins  75034  Mod Eval          Mins  16151  High Eval                            Mins  91044  Re-Eval                               mins  11300    Timed Treatment:   8   mins   Total Treatment:     23   mins    Johnson Buenrostro, PT, DPT, OCS  [place] license: [id number]  Physical Therapist

## 2020-11-24 RX ORDER — AMLODIPINE BESYLATE 10 MG/1
TABLET ORAL
Qty: 30 TABLET | Refills: 7 | Status: SHIPPED | OUTPATIENT
Start: 2020-11-24 | End: 2021-08-02 | Stop reason: SDUPTHER

## 2021-06-14 ENCOUNTER — TREATMENT (OUTPATIENT)
Dept: PHYSICAL THERAPY | Facility: CLINIC | Age: 56
End: 2021-06-14

## 2021-06-14 DIAGNOSIS — M70.62 TROCHANTERIC BURSITIS OF LEFT HIP: ICD-10-CM

## 2021-06-14 DIAGNOSIS — M25.552 PAIN IN LEFT HIP: Primary | ICD-10-CM

## 2021-06-14 PROCEDURE — 97140 MANUAL THERAPY 1/> REGIONS: CPT | Performed by: PHYSICAL THERAPIST

## 2021-06-14 PROCEDURE — 97110 THERAPEUTIC EXERCISES: CPT | Performed by: PHYSICAL THERAPIST

## 2021-06-14 PROCEDURE — 97161 PT EVAL LOW COMPLEX 20 MIN: CPT | Performed by: PHYSICAL THERAPIST

## 2021-06-14 NOTE — PROGRESS NOTES
Physical Therapy Initial Evaluation and Plan of Care    Patient: Latoyna Lowe   : 1965  Diagnosis/ICD-10 Code:  Pain in left hip [M25.552]  Referring practitioner: Self Referring    Subjective Evaluation    History of Present Illness  Mechanism of injury: 56 year old female with reports of L hip/ low back pain starting 1 month ago. Reports pain occurs with walking, stairs, and WB activities. Reports feeling weak in B LE, but only pain occurs in L hip. Reports some mild pain during the night that affects her ability to sleep.       Patient Occupation:  Pain  Current pain ratin  At best pain ratin  At worst pain ratin  Location: L posterior and lateral hip   Quality: dull ache and sharp  Relieving factors: medications  Aggravating factors: standing, stairs, sleeping and ambulation    Diagnostic Tests  No diagnostic tests performed    Treatments  Previous treatment: physical therapy  Patient Goals  Patient goals for therapy: decreased pain, increased strength, independence with ADLs/IADLs and increased motion         Subjective Questionnaire: oxford hip: 33    Objective          Palpation   Left   Tenderness of the piriformis and TFL.     Tenderness     Left Hip   Tenderness in the greater trochanter.     Lumbar Screen  Lumbar range of motion within normal limits.    Neurological Testing     Sensation     Hip   Left Hip   Intact: light touch    Right Hip   Intact: light touch    Active Range of Motion   Left Hip   Normal active range of motion    Right Hip   Normal active range of motion    Strength/Myotome Testing     Left Hip   Planes of Motion   Flexion: 4-  Extension: 4-  Abduction: 4-  Adduction: 4-    Right Hip   Planes of Motion   Flexion: 4  Extension: 4  Abduction: 4  Adduction: 4    Left Knee   Normal strength    Right Knee   Normal strength    Additional Strength Details  Lower abdominals 3+/5    Tests     Left Hip   Negative GERARDO, FADIR, Luci, piriformis, scour, SI compression  and SI distraction.   Modified Silviano: Negative.   SLR: Negative.     Left Hip Flexibility Comments:   WFL    Right Hip Flexibility Comments:   WFL          Assessment & Plan     Assessment  Impairments: abnormal or restricted ROM, impaired physical strength, lacks appropriate home exercise program and pain with function  Assessment details: 56 year old female with reports of L hip/ low back pain starting 1 month ago. Reports pain occurs with walking, stairs, and WB activities. Reports feeling weak in B LE, but only pain occurs in L hip. Reports some mild pain during the night that affects her ability to sleep. Pt presents to skilled PT with L hip pain, TTP over GTB,  weakness of R hip mm. Pt would benefit from skilled PT to address the above findings and improve pt's ease with functional mobility and return to PLOF.    Barriers to therapy: None  Prognosis: good  Functional Limitations: lifting, sleeping, walking, sitting, standing and unable to perform repetitive tasks  Goals  Plan Goals: ST.Pt will report reduction in worst pain level to <4/10  in 2 weeks.  2. Pt will demo good tolerance and compliance with HEP in 2 weeks.  3. Pt will report 50% improved tolerance to standing and walking activities in 2 weeks.    LT.Pt will be independent with HEP in 6 weeks for self management and prevention of re-occurrence.   2.Pt will improve strength of R hip to grossly 4+/5 in 6 weeks.  3. Pt will improve lower abdominal strength to grossly 4/5 in 6 weeks.  4.Pt will report reduction in worst pain level to 2/10 in 6 weeks.  5. Pt will improve oxford hip by 10 points in 6 weeks.    Plan  Therapy options: will be seen for skilled physical therapy services  Planned modality interventions: cryotherapy, thermotherapy (hydrocollator packs), electrical stimulation/Russian stimulation and traction  Planned therapy interventions: abdominal trunk stabilization, body mechanics training, flexibility, functional ROM exercises,  home exercise program, manual therapy, neuromuscular re-education, postural training, soft tissue mobilization, spinal/joint mobilization, strengthening, stretching and therapeutic activities  Frequency: 2x week  Duration in weeks: 6  Treatment plan discussed with: patient            Timed:  Manual Therapy:    15     mins  42121;  Therapeutic Exercise:    15     mins  09724;       Untimed:  Low Eval                        15     Mins  [id number]      Timed Treatment:   30   mins   Total Treatment:     45   mins    PT SIGNATURE: Eneida Pineda, PT       DATE TREATMENT INITIATED: 6/14/2021    Initial Certification  Certification Period: 9/12/2021  I certify that the therapy services are furnished while this patient is under my care.  The services outlined above are required by this patient, and will be reviewed every 90 days.     PHYSICIAN: Referring, Self      DATE:     Please sign and return via fax to [phone].. Thank you, [facility].

## 2021-07-30 RX ORDER — LOSARTAN POTASSIUM 25 MG/1
25 TABLET ORAL DAILY
Qty: 30 TABLET | Refills: 1 | Status: SHIPPED | OUTPATIENT
Start: 2021-07-30 | End: 2021-08-26 | Stop reason: SDUPTHER

## 2021-07-30 NOTE — TELEPHONE ENCOUNTER
Patient is requesting a refill for losartan. Patient would like this sent to [facility].    Patient states she is going out of town today and will need this for her trip.     [facility] patient transferring to [facility]. Informed she needs to make an appt to get est with [facility].

## 2021-08-02 RX ORDER — AMLODIPINE BESYLATE 10 MG/1
10 TABLET ORAL DAILY
Qty: 30 TABLET | Refills: 0 | Status: SHIPPED | OUTPATIENT
Start: 2021-08-02 | End: 2021-08-26 | Stop reason: SDUPTHER

## 2021-08-02 NOTE — TELEPHONE ENCOUNTER
Caller: Latonya Lowe    Relationship: Self    Best call back number: [phone]    Medication needed:   Requested Prescriptions     Pending Prescriptions Disp Refills   • amLODIPine (NORVASC) 10 MG tablet 30 tablet 7     Sig: Take 1 tablet by mouth Daily.       When do you need the refill by: 8/2/21    What additional details did the patient provide when requesting the medication: COMPLETELY OUT. [facility] M[facility]AGE THRU LAST WEEK AND NOTHING WAS SEEN. WOULD LIKE A 30 DAY PRESCRIPTION, SHE WILL CALL AND  MAKE AN APPOINTMENT FOR MED REFILLS     Does the patient have less than a 3 day supply:  [x] Yes  [] No    What is the patient's preferred pharmacy: [facility] [place] [address] - [phone] [facility] [phone]

## 2021-08-26 ENCOUNTER — OFFICE VISIT (OUTPATIENT)
Dept: FAMILY MEDICINE CLINIC | Facility: CLINIC | Age: 56
End: 2021-08-26

## 2021-08-26 ENCOUNTER — LAB (OUTPATIENT)
Dept: FAMILY MEDICINE CLINIC | Facility: CLINIC | Age: 56
End: 2021-08-26

## 2021-08-26 VITALS
HEART RATE: 73 BPM | HEIGHT: 64 IN | BODY MASS INDEX: 25.61 KG/M2 | RESPIRATION RATE: 16 BRPM | SYSTOLIC BLOOD PRESSURE: 122 MMHG | OXYGEN SATURATION: 97 % | WEIGHT: 150 LBS | DIASTOLIC BLOOD PRESSURE: 84 MMHG

## 2021-08-26 DIAGNOSIS — Z13.220 SCREENING, LIPID: ICD-10-CM

## 2021-08-26 DIAGNOSIS — Z12.11 SCREENING FOR COLON CANCER: ICD-10-CM

## 2021-08-26 DIAGNOSIS — I10 ESSENTIAL HYPERTENSION: ICD-10-CM

## 2021-08-26 DIAGNOSIS — Z00.00 ANNUAL PHYSICAL EXAM: Primary | ICD-10-CM

## 2021-08-26 DIAGNOSIS — K21.00 GASTROESOPHAGEAL REFLUX DISEASE WITH ESOPHAGITIS WITHOUT HEMORRHAGE: ICD-10-CM

## 2021-08-26 DIAGNOSIS — Z00.00 ANNUAL PHYSICAL EXAM: ICD-10-CM

## 2021-08-26 DIAGNOSIS — Z12.31 ENCOUNTER FOR SCREENING MAMMOGRAM FOR MALIGNANT NEOPLASM OF BREAST: ICD-10-CM

## 2021-08-26 LAB
25(OH)D3 SERPL-MCNC: 42.4 NG/ML
ALBUMIN SERPL-MCNC: 5 G/DL (ref 3.5–5.2)
ALBUMIN/GLOB SERPL: 1.8 G/DL
ALP SERPL-CCNC: 66 U/L (ref 39–117)
ALT SERPL W P-5'-P-CCNC: 18 U/L (ref 1–33)
ANION GAP SERPL CALCULATED.3IONS-SCNC: 12.8 MMOL/L (ref 5–15)
AST SERPL-CCNC: 21 U/L (ref 1–32)
BASOPHILS # BLD AUTO: 0.06 10*3/MM3 (ref 0–0.2)
BASOPHILS NFR BLD AUTO: 0.8 % (ref 0–1.5)
BILIRUB SERPL-MCNC: 0.4 MG/DL (ref 0–1.2)
BUN SERPL-MCNC: 15 MG/DL (ref 6–20)
BUN/CREAT SERPL: 15.8 (ref 7–25)
CALCIUM SPEC-SCNC: 9.6 MG/DL (ref 8.6–10.5)
CHLORIDE SERPL-SCNC: 101 MMOL/L (ref 98–107)
CHOLEST SERPL-MCNC: 184 MG/DL (ref 0–200)
CO2 SERPL-SCNC: 24.2 MMOL/L (ref 22–29)
CREAT SERPL-MCNC: 0.95 MG/DL (ref 0.57–1)
DEPRECATED RDW RBC AUTO: 44.8 FL (ref 37–54)
EOSINOPHIL # BLD AUTO: 0.14 10*3/MM3 (ref 0–0.4)
EOSINOPHIL NFR BLD AUTO: 2 % (ref 0.3–6.2)
ERYTHROCYTE [DISTWIDTH] IN BLOOD BY AUTOMATED COUNT: 13.1 % (ref 12.3–15.4)
GFR SERPL CREATININE-BSD FRML MDRD: 61 ML/MIN/1.73
GLOBULIN UR ELPH-MCNC: 2.8 GM/DL
GLUCOSE SERPL-MCNC: 94 MG/DL (ref 65–99)
HCT VFR BLD AUTO: 42.4 % (ref 34–46.6)
HDLC SERPL-MCNC: 80 MG/DL (ref 40–60)
HGB BLD-MCNC: 14 G/DL (ref 12–15.9)
IMM GRANULOCYTES # BLD AUTO: 0.02 10*3/MM3 (ref 0–0.05)
IMM GRANULOCYTES NFR BLD AUTO: 0.3 % (ref 0–0.5)
LDLC SERPL CALC-MCNC: 87 MG/DL (ref 0–100)
LDLC/HDLC SERPL: 1.06 {RATIO}
LYMPHOCYTES # BLD AUTO: 2.92 10*3/MM3 (ref 0.7–3.1)
LYMPHOCYTES NFR BLD AUTO: 41.1 % (ref 19.6–45.3)
MCH RBC QN AUTO: 31.3 PG (ref 26.6–33)
MCHC RBC AUTO-ENTMCNC: 33 G/DL (ref 31.5–35.7)
MCV RBC AUTO: 94.6 FL (ref 79–97)
MONOCYTES # BLD AUTO: 0.51 10*3/MM3 (ref 0.1–0.9)
MONOCYTES NFR BLD AUTO: 7.2 % (ref 5–12)
NEUTROPHILS NFR BLD AUTO: 3.45 10*3/MM3 (ref 1.7–7)
NEUTROPHILS NFR BLD AUTO: 48.6 % (ref 42.7–76)
NRBC BLD AUTO-RTO: 0.1 /100 WBC (ref 0–0.2)
PLATELET # BLD AUTO: 396 10*3/MM3 (ref 140–450)
PMV BLD AUTO: 8.6 FL (ref 6–12)
POTASSIUM SERPL-SCNC: 4.2 MMOL/L (ref 3.5–5.2)
PROT SERPL-MCNC: 7.8 G/DL (ref 6–8.5)
RBC # BLD AUTO: 4.48 10*6/MM3 (ref 3.77–5.28)
SODIUM SERPL-SCNC: 138 MMOL/L (ref 136–145)
TRIGL SERPL-MCNC: 98 MG/DL (ref 0–150)
VLDLC SERPL-MCNC: 17 MG/DL (ref 5–40)
WBC # BLD AUTO: 7.1 10*3/MM3 (ref 3.4–10.8)

## 2021-08-26 PROCEDURE — 36415 COLL VENOUS BLD VENIPUNCTURE: CPT

## 2021-08-26 PROCEDURE — 82306 VITAMIN D 25 HYDROXY: CPT | Performed by: NURSE PRACTITIONER

## 2021-08-26 PROCEDURE — 80061 LIPID PANEL: CPT | Performed by: NURSE PRACTITIONER

## 2021-08-26 PROCEDURE — 85025 COMPLETE CBC W/AUTO DIFF WBC: CPT | Performed by: NURSE PRACTITIONER

## 2021-08-26 PROCEDURE — 80053 COMPREHEN METABOLIC PANEL: CPT | Performed by: NURSE PRACTITIONER

## 2021-08-26 PROCEDURE — 99386 PREV VISIT NEW AGE 40-64: CPT | Performed by: NURSE PRACTITIONER

## 2021-08-26 RX ORDER — OMEPRAZOLE 20 MG/1
TABLET, DELAYED RELEASE ORAL
Status: CANCELLED | OUTPATIENT
Start: 2021-08-26

## 2021-08-26 RX ORDER — AMLODIPINE BESYLATE 10 MG/1
10 TABLET ORAL DAILY
Qty: 90 TABLET | Refills: 1 | Status: SHIPPED | OUTPATIENT
Start: 2021-08-26

## 2021-08-26 RX ORDER — LOSARTAN POTASSIUM 25 MG/1
25 TABLET ORAL DAILY
Qty: 90 TABLET | Refills: 1 | Status: SHIPPED | OUTPATIENT
Start: 2021-08-26

## 2021-08-26 RX ORDER — FAMOTIDINE 20 MG/1
20 TABLET, FILM COATED ORAL 2 TIMES DAILY PRN
Qty: 60 TABLET | Refills: 1 | Status: SHIPPED | OUTPATIENT
Start: 2021-08-26

## 2021-08-26 NOTE — PATIENT INSTRUCTIONS
Gastroesophageal Reflux Disease, Adult  Gastroesophageal reflux (FAUZIA) happens when acid from the stomach flows up into the tube that connects the mouth and the stomach (esophagus). Normally, food travels down the esophagus and stays in the stomach to be digested. However, when a person has FAUZIA, food and stomach acid sometimes move back up into the esophagus. If this becomes a more serious problem, the person may be diagnosed with a disease called gastroesophageal reflux disease (GERD). GERD occurs when the reflux:  · Happens often.  · Causes frequent or severe symptoms.  · Causes problems such as damage to the esophagus.  When stomach acid comes in contact with the esophagus, the acid may cause soreness (inflammation) in the esophagus. Over time, GERD may create small holes (ulcers) in the lining of the esophagus.  What are the causes?  This condition is caused by a problem with the muscle between the esophagus and the stomach (lower esophageal sphincter, or LES). Normally, the LES muscle closes after food passes through the esophagus to the stomach. When the LES is weakened or abnormal, it does not close properly, and that allows food and stomach acid to go back up into the esophagus.  The LES can be weakened by certain dietary substances, medicines, and medical conditions, including:  · Tobacco use.  · Pregnancy.  · Having a hiatal hernia.  · Alcohol use.  · Certain foods and beverages, such as coffee, chocolate, onions, and peppermint.  What increases the risk?  You are more likely to develop this condition if you:  · Have an increased body weight.  · Have a connective tissue disorder.  · Use NSAID medicines.  What are the signs or symptoms?  Symptoms of this condition include:  · Heartburn.  · Difficult or painful swallowing.  · The feeling of having a lump in the throat.  · A bitter taste in the mouth.  · Bad breath.  · Having a large amount of saliva.  · Having an upset or bloated  stomach.  · Belching.  · Chest pain. Different conditions can cause chest pain. Make sure you see your health care provider if you experience chest pain.  · Shortness of breath or wheezing.  · Ongoing (chronic) cough or a night-time cough.  · Wearing away of tooth enamel.  · Weight loss.  How is this diagnosed?  Your health care provider will take a medical history and perform a physical exam. To determine if you have mild or severe GERD, your health care provider may also monitor how you respond to treatment. You may also have tests, including:  · A test to examine your stomach and esophagus with a small camera (endoscopy).  · A test that measures the acidity level in your esophagus.  · A test that measures how much pressure is on your esophagus.  · A barium swallow or modified barium swallow test to show the shape, size, and functioning of your esophagus.  How is this treated?  The goal of treatment is to help relieve your symptoms and to prevent complications. Treatment for this condition may vary depending on how severe your symptoms are. Your health care provider may recommend:  · Changes to your diet.  · Medicine.  · Surgery.  Follow these instructions at home:  Eating and drinking    · Follow a diet as recommended by your health care provider. This may involve avoiding foods and drinks such as:  ? Coffee and tea (with or without caffeine).  ? Drinks that contain alcohol.  ? Energy drinks and sports drinks.  ? Carbonated drinks or sodas.  ? Chocolate and cocoa.  ? Peppermint and mint flavorings.  ? Garlic and onions.  ? Horseradish.  ? Spicy and acidic foods, including peppers, chili powder, cevallos powder, vinegar, hot sauces, and barbecue sauce.  ? Citrus fruit juices and citrus fruits, such as oranges, jake, and limes.  ? Tomato-based foods, such as red sauce, chili, salsa, and pizza with red sauce.  ? Fried and fatty foods, such as donuts, french fries, potato chips, and high-fat dressings.  ? High-fat  meats, such as hot dogs and fatty cuts of red and white meats, such as rib eye steak, sausage, ham, and daigle.  ? High-fat dairy items, such as whole milk, butter, and cream cheese.  · Eat small, frequent meals instead of large meals.  · Avoid drinking large amounts of liquid with your meals.  · Avoid eating meals during the 2-3 hours before bedtime.  · Avoid lying down right after you eat.  · Do not exercise right after you eat.  Lifestyle    · Do not use any products that contain nicotine or tobacco, such as cigarettes, e-cigarettes, and chewing tobacco. If you need help quitting, ask your health care provider.  · Try to reduce your stress by using methods such as yoga or meditation. If you need help reducing stress, ask your health care provider.  · If you are overweight, reduce your weight to an amount that is healthy for you. Ask your health care provider for guidance about a safe weight loss goal.  General instructions  · Pay attention to any changes in your symptoms.  · Take over-the-counter and prescription medicines only as told by your health care provider. Do not take aspirin, ibuprofen, or other NSAIDs unless your health care provider told you to do so.  · Wear loose-fitting clothing. Do not wear anything tight around your waist that causes pressure on your abdomen.  · Raise (elevate) the head of your bed about 6 inches (15 cm).  · Avoid bending over if this makes your symptoms worse.  · Keep all follow-up visits as told by your health care provider. This is important.  Contact a health care provider if:  · You have:  ? New symptoms.  ? Unexplained weight loss.  ? Difficulty swallowing or it hurts to swallow.  ? Wheezing or a persistent cough.  ? A hoarse voice.  · Your symptoms do not improve with treatment.  Get help right away if you:  · Have pain in your arms, neck, jaw, teeth, or back.  · Feel sweaty, dizzy, or light-headed.  · Have chest pain or shortness of breath.  · Vomit and your vomit looks  like blood or coffee grounds.  · Faint.  · Have stool that is bloody or black.  · Cannot swallow, drink, or eat.  Summary  · Gastroesophageal reflux happens when acid from the stomach flows up into the esophagus. GERD is a disease in which the reflux happens often, causes frequent or severe symptoms, or causes problems such as damage to the esophagus.  · Treatment for this condition may vary depending on how severe your symptoms are. Your health care provider may recommend diet and lifestyle changes, medicine, or surgery.  · Contact a health care provider if you have new or worsening symptoms.  · Take over-the-counter and prescription medicines only as told by your health care provider. Do not take aspirin, ibuprofen, or other NSAIDs unless your health care provider told you to do so.  · Keep all follow-up visits as told by your health care provider. This is important.  This information is not intended to replace advice given to you by your health care provider. Make sure you discuss any questions you have with your health care provider.  Document Revised: 06/26/2019 Document Reviewed: 06/26/2019  Elsevier Patient Education © 2021 [employer] Inc[employer]

## 2021-08-26 NOTE — PROGRESS NOTES
"Chief Complaint  Annual Exam    Subjective          Latonya Lowe presents to [facility]  History of Present Illness  New patient to me is here today for annual exam, needs medicine refills  Has chronic heartburn, taking prilosec, tells me that if she stops the prilosec, the heartburn is a problem  wishing to discontinue that as a daily medicine, will try pepcid, recommend diet modifications    Also with h/o htn, bp is a little elevated initially today 134/86, recheck is 122/84  Denies any c/o chest pain, soa, edema, dizziness, weakness, headaches    Is active, but does not exercising regularly  Eats a healthy, well balanced diet  Is fasting for labs this morning    Due for colonoscopy and mammogram    Review of Systems   HENT: Negative.    Respiratory: Negative.    Gastrointestinal: Negative.    Genitourinary: Negative.    Musculoskeletal: Positive for arthralgias.        Bilateral knee pain   Allergic/Immunologic: Positive for environmental allergies.   Neurological: Negative.      C/o having some intermittent b/l knee pain, questions whether is arthritis, tends to be bothersome more towards the end of the day, declines work-up for this at this time, will return if problem persists.    Objective   Vital Signs:   /84 (BP Location: Right arm, Patient Position: Sitting)   Pulse 73   Resp 16   Ht 162.6 cm (64\")   Wt 68 kg (150 lb)   SpO2 97%   BMI 25.75 kg/m²     Physical Exam  Vitals reviewed.   Constitutional:       Appearance: Normal appearance.   HENT:      Right Ear: Tympanic membrane and ear canal normal.      Left Ear: Tympanic membrane and ear canal normal.      Nose: Nose normal.      Mouth/Throat:      Mouth: Mucous membranes are moist.      Pharynx: Oropharynx is clear.   Eyes:      Extraocular Movements: Extraocular movements intact.      Pupils: Pupils are equal, round, and reactive to light.   Neck:      Vascular: No carotid bruit.   Cardiovascular:      Rate " and Rhythm: Normal rate and regular rhythm.      Pulses: Normal pulses.      Heart sounds: Normal heart sounds.   Pulmonary:      Effort: Pulmonary effort is normal.      Breath sounds: Normal breath sounds.   Abdominal:      General: Bowel sounds are normal.      Palpations: Abdomen is soft. There is no mass.      Tenderness: There is no abdominal tenderness.   Musculoskeletal:         General: Normal range of motion.      Cervical back: Neck supple. No tenderness.   Lymphadenopathy:      Cervical: No cervical adenopathy.   Skin:     General: Skin is warm.      Capillary Refill: Capillary refill takes less than 2 seconds.   Neurological:      Mental Status: She is alert and oriented to person, place, and time.        Result Review :                 Assessment and Plan    Diagnoses and all orders for this visit:    1. Annual physical exam (Primary)  -     CBC w AUTO Differential; Future  -     Vitamin D 25 hydroxy; Future    2. Essential hypertension  -     amLODIPine (NORVASC) 10 MG tablet; Take 1 tablet by mouth Daily.  Dispense: 90 tablet; Refill: 1  -     losartan (Cozaar) 25 MG tablet; Take 1 tablet by mouth Daily.  Dispense: 90 tablet; Refill: 1  -     Comprehensive metabolic panel; Future    3. Gastroesophageal reflux disease with esophagitis without hemorrhage  -     famotidine (Pepcid) 20 MG tablet; Take 1 tablet by mouth 2 (Two) Times a Day As Needed for Heartburn.  Dispense: 60 tablet; Refill: 1    4. Screening, lipid  -     Lipid panel; Future    5. Screening for colon cancer  -     Ambulatory Referral For Screening Colonoscopy    6. Encounter for screening mammogram for malignant neoplasm of breast  -     Mammo Screening Digital Tomosynthesis Bilateral With CAD; Future    Other orders  -     Cancel: omeprazole OTC (PrilOSEC OTC) 20 MG EC tablet        Follow Up   Return in about 6 months (around 2/26/2022) for Recheck blood pressure.  Patient was given instructions and counseling regarding her condition  or for health maintenance advice. Please see specific information pulled into the AVS if appropriate.

## 2022-03-09 ENCOUNTER — OFFICE (AMBULATORY)
Dept: URBAN - METROPOLITAN AREA PATHOLOGY 4 | Facility: PATHOLOGY | Age: 57
End: 2022-03-09
Payer: COMMERCIAL

## 2022-03-09 ENCOUNTER — ON CAMPUS - OUTPATIENT (AMBULATORY)
Dept: URBAN - METROPOLITAN AREA HOSPITAL 2 | Facility: HOSPITAL | Age: 57
End: 2022-03-09

## 2022-03-09 ENCOUNTER — OFFICE (AMBULATORY)
Dept: URBAN - METROPOLITAN AREA PATHOLOGY 4 | Facility: PATHOLOGY | Age: 57
End: 2022-03-09

## 2022-03-09 VITALS
SYSTOLIC BLOOD PRESSURE: 96 MMHG | DIASTOLIC BLOOD PRESSURE: 70 MMHG | DIASTOLIC BLOOD PRESSURE: 82 MMHG | HEART RATE: 79 BPM | SYSTOLIC BLOOD PRESSURE: 114 MMHG | HEART RATE: 62 BPM | RESPIRATION RATE: 12 BRPM | HEART RATE: 98 BPM | OXYGEN SATURATION: 99 % | TEMPERATURE: 96.8 F | SYSTOLIC BLOOD PRESSURE: 100 MMHG | OXYGEN SATURATION: 100 % | HEART RATE: 113 BPM | DIASTOLIC BLOOD PRESSURE: 91 MMHG | DIASTOLIC BLOOD PRESSURE: 74 MMHG | OXYGEN SATURATION: 98 % | HEART RATE: 94 BPM | OXYGEN SATURATION: 97 % | SYSTOLIC BLOOD PRESSURE: 102 MMHG | HEART RATE: 82 BPM | HEART RATE: 106 BPM | HEIGHT: 64 IN | SYSTOLIC BLOOD PRESSURE: 126 MMHG | OXYGEN SATURATION: 96 % | DIASTOLIC BLOOD PRESSURE: 92 MMHG | SYSTOLIC BLOOD PRESSURE: 99 MMHG | HEART RATE: 77 BPM | DIASTOLIC BLOOD PRESSURE: 57 MMHG | SYSTOLIC BLOOD PRESSURE: 112 MMHG | SYSTOLIC BLOOD PRESSURE: 101 MMHG | SYSTOLIC BLOOD PRESSURE: 116 MMHG | DIASTOLIC BLOOD PRESSURE: 99 MMHG | RESPIRATION RATE: 18 BRPM | HEART RATE: 96 BPM | HEART RATE: 76 BPM | RESPIRATION RATE: 16 BRPM | DIASTOLIC BLOOD PRESSURE: 73 MMHG | SYSTOLIC BLOOD PRESSURE: 121 MMHG | HEART RATE: 90 BPM | WEIGHT: 142 LBS | DIASTOLIC BLOOD PRESSURE: 66 MMHG | HEART RATE: 102 BPM

## 2022-03-09 DIAGNOSIS — D12.0 BENIGN NEOPLASM OF CECUM: ICD-10-CM

## 2022-03-09 DIAGNOSIS — Z12.11 ENCOUNTER FOR SCREENING FOR MALIGNANT NEOPLASM OF COLON: ICD-10-CM

## 2022-03-09 LAB
GI HISTOLOGY: A. UNSPECIFIED: (no result)
GI HISTOLOGY: PDF REPORT: (no result)

## 2022-03-09 PROCEDURE — 45385 COLONOSCOPY W/LESION REMOVAL: CPT | Mod: 33 | Performed by: INTERNAL MEDICINE

## 2022-03-09 PROCEDURE — 88305 TISSUE EXAM BY PATHOLOGIST: CPT | Mod: 26 | Performed by: INTERNAL MEDICINE

## 2022-04-21 ENCOUNTER — DOCUMENTATION (OUTPATIENT)
Dept: PHYSICAL THERAPY | Facility: CLINIC | Age: 57
End: 2022-04-21

## 2022-04-21 DIAGNOSIS — M25.552 PAIN IN LEFT HIP: Primary | ICD-10-CM

## 2022-04-21 DIAGNOSIS — M70.62 TROCHANTERIC BURSITIS OF LEFT HIP: ICD-10-CM

## 2022-04-21 NOTE — PROGRESS NOTES
Discharge Summary  Discharge Summary from Physical Therapy Report    Patient: Latonya Lowe   : 1965  Diagnosis/ICD-10 Code:  Pain in left hip [M25.552]  Referring practitioner: No ref. provider found  Date of Initial Visit: 2022  Today's Date: 2022  Patient was seen for Visit count could not be calculated. Make sure you are using a visit which is associated with an episode. sessions         Discharge Status of Patient: See Progress Note dated 2021    Goals: Not Met    Discharge Plan: Continue with current home exercise program as instructed    Comments Pt did not return following IE.           Eneida Pineda, PT  Physical Therapist  PT license: [id number]

## 2022-08-01 ENCOUNTER — APPOINTMENT (OUTPATIENT)
Dept: GENERAL RADIOLOGY | Facility: HOSPITAL | Age: 57
End: 2022-08-01

## 2022-08-01 ENCOUNTER — HOSPITAL ENCOUNTER (EMERGENCY)
Facility: HOSPITAL | Age: 57
Discharge: HOME | End: 2022-08-01
Attending: EMERGENCY MEDICINE | Admitting: EMERGENCY MEDICINE

## 2022-08-01 VITALS
WEIGHT: 151.01 LBS | BODY MASS INDEX: 25.78 KG/M2 | RESPIRATION RATE: 18 BRPM | SYSTOLIC BLOOD PRESSURE: 135 MMHG | DIASTOLIC BLOOD PRESSURE: 84 MMHG | TEMPERATURE: 98 F | OXYGEN SATURATION: 98 % | HEIGHT: 64 IN | HEART RATE: 75 BPM
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
## 2022-08-01 DIAGNOSIS — S80.11XA CONTUSION OF RIGHT LOWER EXTREMITY, INITIAL ENCOUNTER: ICD-10-CM

## 2022-08-01 DIAGNOSIS — S42.451A CLOSED DISPLACED FRACTURE OF LATERAL CONDYLE OF RIGHT HUMERUS, INITIAL ENCOUNTER: Primary | ICD-10-CM

## 2022-08-01 PROCEDURE — 73070 X-RAY EXAM OF ELBOW: CPT

## 2022-08-01 PROCEDURE — 99283 EMERGENCY DEPT VISIT LOW MDM: CPT

## 2022-08-01 PROCEDURE — 73552 X-RAY EXAM OF FEMUR 2/>: CPT

## 2022-08-01 RX ORDER — HYDROCODONE BITARTRATE AND ACETAMINOPHEN 7.5; 325 MG/1; MG/1
1 TABLET ORAL ONCE
Status: COMPLETED | OUTPATIENT
Start: 2022-08-01 | End: 2022-08-01

## 2022-08-01 RX ORDER — HYDROCODONE BITARTRATE AND ACETAMINOPHEN 7.5; 325 MG/1; MG/1
1-2 TABLET ORAL EVERY 6 HOURS PRN
Qty: 15 TABLET | Refills: 0 | Status: SHIPPED | OUTPATIENT
Start: 2022-08-01

## 2022-08-01 RX ADMIN — HYDROCODONE BITARTRATE AND ACETAMINOPHEN 1 TABLET: 7.5; 325 TABLET ORAL at 13:54

## 2022-08-01 NOTE — ED NOTES
"Pt upset.  Registration is having issues with insurance crossing over.  Someone registered her earlier and insurance did not cross over which alerted Med Assist to come see her about insurance.  Pt offended stating \"you all are shunning me because you think I don't have insurance, I am in pain and that doctor needs to come in and tell me what is going on\".  I listened to her complaints and validated her concerns.  I advised her that I would notify the md to come in and talk to her.  I explained to her that we had several emergent patient which tied up the physicians time and apologized.  I asked her if I could get her anything or do anything for her since I had the time.  Pt stated no.   at bedside and explained to pt that it looked really busy out there.    "

## 2022-08-01 NOTE — ED PROVIDER NOTES
Subjective   Patient is a 57-year-old female who had slipped and fallen today.  She complains of pain to her left elbow and her right thigh.  She denies other complaint of injury.          Review of Systems  Negative for head pain neck pain chest pain shortness of breath abdominal pain back pain numbness tingling or other complaint of injury  Past Medical History:   Diagnosis Date   • Cervical dysplasia    • GERD (gastroesophageal reflux disease)    • History of varicose veins    • Hypertension        No Known Allergies    Past Surgical History:   Procedure Laterality Date   •  SECTION     • ECTOPIC PREGNANCY SURGERY     • HYSTERECTOMY      CERVICAL DYSPLASIA  SEES DR MOODY (GYN)       Family History   Problem Relation Age of Onset   • Hip fracture Mother    • Esophageal cancer Father    • Hyperlipidemia Father    • Esophageal cancer Other    • Hypertension Other        Social History     Socioeconomic History   • Marital status:    Tobacco Use   • Smoking status: Never Smoker   • Smokeless tobacco: Never Used   Vaping Use   • Vaping Use: Never used   Substance and Sexual Activity   • Alcohol use: Yes     Alcohol/week: 10.0 standard drinks     Types: 10 Glasses of wine per week     Comment: NIGHTLY   • Drug use: Never   • Sexual activity: Yes     Partners: Male     Birth control/protection: Post-menopausal           Objective   Physical Exam  HEENT exam is no point tenderness.  Neck is nontender.  Lungs clear.  Chest nontender.  Ab soft nontender.  Back has no spinous tenderness.  Extremities M shows pain and swelling throughout her left elbow.  She also is mild pain to palpation of her right posterior thigh.  Chest 2+ pulses throughout and is neurovascularly intact.  Procedures           ED Course          XR ELBOW 2 VIEW LEFT    Result Date: 2022  Displaced lateral humeral condylar fracture with the joint effusion. No gross elbow joint dislocation.  Electronically Signed By-Mervat Canas MD  On:8/1/2022 12:20 PM This report was finalized on [id number] by  Mervat Canas MD.    XR Femur 2 View Right    Result Date: 8/1/2022  Normal 2 views of the right femur.  Electronically Signed By-Mervat Canas MD On:8/1/2022 12:18 PM This report was finalized on [id number] by  Mervat Canas MD.                                      MDM  Number of Diagnoses or Management Options  Diagnosis management comments: Patient is findings consistent with a displaced lateral humeral condyle fracture.  Her right femur x-ray is normal.  Patient will be placed in a sling.  I did speak to the on-call orthopedic surgeon.  Patient will be discharged to follow-up with him in the office.       Amount and/or Complexity of Data Reviewed  Tests in the radiology section of CPT®: reviewed    Risk of Complications, Morbidity, and/or Mortality  Presenting problems: high  Diagnostic procedures: high  Management options: high    Patient Progress  Patient progress: stable      Final diagnoses:   Closed displaced fracture of lateral condyle of right humerus, initial encounter   Contusion of right lower extremity, initial encounter   Fall, initial encounter       ED Disposition  ED Disposition     ED Disposition   Discharge    Condition   Stable    Comment   --             No follow-up provider specified.       Medication List      New Prescriptions    HYDROcodone-acetaminophen 7.5-325 MG per tablet  Commonly known as: NORCO  Take 1-2 tablets by mouth Every 6 (Six) Hours As Needed for Moderate Pain .           Where to Get Your Medications      These medications were sent to [facility], [place] - [address] [facility]  - [phone]  - [phone] FX  [address] [place] [address] [place] [place] [address]    Phone: [phone]   · HYDROcodone-acetaminophen 7.5-325 MG per tablet          Chuck Contreras MD  08/01/22 [id number]

## 2022-08-02 ENCOUNTER — TRANSCRIBE ORDERS (OUTPATIENT)
Dept: ADMINISTRATIVE | Facility: HOSPITAL | Age: 57
End: 2022-08-02

## 2022-08-02 ENCOUNTER — ANESTHESIA EVENT (OUTPATIENT)
Dept: PERIOP | Facility: HOSPITAL | Age: 57
End: 2022-08-02

## 2022-08-02 ENCOUNTER — HOSPITAL ENCOUNTER (OUTPATIENT)
Dept: CARDIOLOGY | Facility: HOSPITAL | Age: 57
Discharge: HOME | End: 2022-08-02

## 2022-08-02 ENCOUNTER — LAB (OUTPATIENT)
Dept: LAB | Facility: HOSPITAL | Age: 57
End: 2022-08-02

## 2022-08-02 DIAGNOSIS — Z01.818 PREOP TESTING: ICD-10-CM

## 2022-08-02 DIAGNOSIS — Z01.818 PREOP TESTING: Primary | ICD-10-CM

## 2022-08-02 LAB
ANION GAP SERPL CALCULATED.3IONS-SCNC: 9.3 MMOL/L (ref 5–15)
BACTERIA UR QL AUTO: ABNORMAL /HPF
BASOPHILS # BLD AUTO: 0.05 10*3/MM3 (ref 0–0.2)
BASOPHILS NFR BLD AUTO: 0.6 % (ref 0–1.5)
BILIRUB UR QL STRIP: NEGATIVE
BUN SERPL-MCNC: 11 MG/DL (ref 6–20)
BUN/CREAT SERPL: 15.1 (ref 7–25)
CALCIUM SPEC-SCNC: 8.8 MG/DL (ref 8.6–10.5)
CHLORIDE SERPL-SCNC: 96 MMOL/L (ref 98–107)
CLARITY UR: CLEAR
CO2 SERPL-SCNC: 25.7 MMOL/L (ref 22–29)
COLOR UR: YELLOW
CREAT SERPL-MCNC: 0.73 MG/DL (ref 0.57–1)
DEPRECATED RDW RBC AUTO: 41.5 FL (ref 37–54)
EGFRCR SERPLBLD CKD-EPI 2021: 95.5 ML/MIN/1.73
EOSINOPHIL # BLD AUTO: 0.13 10*3/MM3 (ref 0–0.4)
EOSINOPHIL NFR BLD AUTO: 1.5 % (ref 0.3–6.2)
ERYTHROCYTE [DISTWIDTH] IN BLOOD BY AUTOMATED COUNT: 12.9 % (ref 12.3–15.4)
GLUCOSE SERPL-MCNC: 88 MG/DL (ref 65–99)
GLUCOSE UR STRIP-MCNC: NEGATIVE MG/DL
HCT VFR BLD AUTO: 33.9 % (ref 34–46.6)
HGB BLD-MCNC: 11.5 G/DL (ref 12–15.9)
HGB UR QL STRIP.AUTO: NEGATIVE
HYALINE CASTS UR QL AUTO: ABNORMAL /LPF
IMM GRANULOCYTES # BLD AUTO: 0.02 10*3/MM3 (ref 0–0.05)
IMM GRANULOCYTES NFR BLD AUTO: 0.2 % (ref 0–0.5)
KETONES UR QL STRIP: NEGATIVE
LEUKOCYTE ESTERASE UR QL STRIP.AUTO: ABNORMAL
LYMPHOCYTES # BLD AUTO: 2.78 10*3/MM3 (ref 0.7–3.1)
LYMPHOCYTES NFR BLD AUTO: 31.1 % (ref 19.6–45.3)
MCH RBC QN AUTO: 30.8 PG (ref 26.6–33)
MCHC RBC AUTO-ENTMCNC: 33.9 G/DL (ref 31.5–35.7)
MCV RBC AUTO: 90.9 FL (ref 79–97)
MONOCYTES # BLD AUTO: 0.84 10*3/MM3 (ref 0.1–0.9)
MONOCYTES NFR BLD AUTO: 9.4 % (ref 5–12)
MRSA DNA SPEC QL NAA+PROBE: NORMAL
NEUTROPHILS NFR BLD AUTO: 5.12 10*3/MM3 (ref 1.7–7)
NEUTROPHILS NFR BLD AUTO: 57.2 % (ref 42.7–76)
NITRITE UR QL STRIP: NEGATIVE
NRBC BLD AUTO-RTO: 0 /100 WBC (ref 0–0.2)
PH UR STRIP.AUTO: 6 [PH] (ref 5–8)
PLATELET # BLD AUTO: 339 10*3/MM3 (ref 140–450)
PMV BLD AUTO: 8.6 FL (ref 6–12)
POTASSIUM SERPL-SCNC: 4 MMOL/L (ref 3.5–5.2)
PROT UR QL STRIP: NEGATIVE
RBC # BLD AUTO: 3.73 10*6/MM3 (ref 3.77–5.28)
RBC # UR STRIP: ABNORMAL /HPF
REF LAB TEST METHOD: ABNORMAL
SARS-COV-2 ORF1AB RESP QL NAA+PROBE: NOT DETECTED
SODIUM SERPL-SCNC: 131 MMOL/L (ref 136–145)
SP GR UR STRIP: 1.01 (ref 1–1.03)
SQUAMOUS #/AREA URNS HPF: ABNORMAL /HPF
UROBILINOGEN UR QL STRIP: ABNORMAL
WBC # UR STRIP: ABNORMAL /HPF
WBC NRBC COR # BLD: 8.94 10*3/MM3 (ref 3.4–10.8)

## 2022-08-02 PROCEDURE — 36415 COLL VENOUS BLD VENIPUNCTURE: CPT

## 2022-08-02 PROCEDURE — 85025 COMPLETE CBC W/AUTO DIFF WBC: CPT

## 2022-08-02 PROCEDURE — 87086 URINE CULTURE/COLONY COUNT: CPT

## 2022-08-02 PROCEDURE — U0004 COV-19 TEST NON-CDC HGH THRU: HCPCS

## 2022-08-02 PROCEDURE — 80048 BASIC METABOLIC PNL TOTAL CA: CPT

## 2022-08-02 PROCEDURE — 81001 URINALYSIS AUTO W/SCOPE: CPT

## 2022-08-02 PROCEDURE — 93005 ELECTROCARDIOGRAM TRACING: CPT | Performed by: ORTHOPAEDIC SURGERY

## 2022-08-02 PROCEDURE — 93010 ELECTROCARDIOGRAM REPORT: CPT | Performed by: INTERNAL MEDICINE

## 2022-08-02 PROCEDURE — C9803 HOPD COVID-19 SPEC COLLECT: HCPCS

## 2022-08-02 PROCEDURE — 87641 MR-STAPH DNA AMP PROBE: CPT

## 2022-08-02 RX ORDER — OMEPRAZOLE 40 MG/1
40 CAPSULE, DELAYED RELEASE ORAL DAILY
COMMUNITY

## 2022-08-02 RX ORDER — ERGOCALCIFEROL (VITAMIN D2) 10 MCG
400 TABLET ORAL DAILY
COMMUNITY

## 2022-08-02 NOTE — ANESTHESIA PREPROCEDURE EVALUATION
Anesthesia Evaluation     Patient summary reviewed and Nursing notes reviewed   NPO Solid Status: > 8 hours  NPO Liquid Status: > 8 hours           Airway   Mallampati: I  TM distance: >3 FB  Neck ROM: full  No difficulty expected  Dental - normal exam     Pulmonary - negative pulmonary ROS and normal exam   Cardiovascular - normal exam    (+) hypertension,       Neuro/Psych- negative ROS  GI/Hepatic/Renal/Endo    (+)  GERD,      Musculoskeletal (-) negative ROS    Abdominal  - normal exam    Bowel sounds: normal.   Substance History   (+) alcohol use,      OB/GYN negative ob/gyn ROS         Other                        Anesthesia Plan    ASA 2     general with block     intravenous induction     Anesthetic plan, risks, benefits, and alternatives have been provided, discussed and informed consent has been obtained with: patient.        CODE STATUS:

## 2022-08-03 ENCOUNTER — ANESTHESIA (OUTPATIENT)
Dept: PERIOP | Facility: HOSPITAL | Age: 57
End: 2022-08-03

## 2022-08-03 ENCOUNTER — APPOINTMENT (OUTPATIENT)
Dept: GENERAL RADIOLOGY | Facility: HOSPITAL | Age: 57
End: 2022-08-03

## 2022-08-03 ENCOUNTER — HOSPITAL ENCOUNTER (OUTPATIENT)
Facility: HOSPITAL | Age: 57
Setting detail: HOSPITAL OUTPATIENT SURGERY
Discharge: HOME | End: 2022-08-03
Attending: ORTHOPAEDIC SURGERY | Admitting: ORTHOPAEDIC SURGERY

## 2022-08-03 VITALS
HEART RATE: 98 BPM | OXYGEN SATURATION: 95 % | DIASTOLIC BLOOD PRESSURE: 82 MMHG | RESPIRATION RATE: 16 BRPM | BODY MASS INDEX: 25.37 KG/M2 | WEIGHT: 148.6 LBS | SYSTOLIC BLOOD PRESSURE: 124 MMHG | HEIGHT: 64 IN | TEMPERATURE: 96.7 F

## 2022-08-03 DIAGNOSIS — S42.412A CLOSED SUPRACONDYLAR FRACTURE OF LEFT HUMERUS, INITIAL ENCOUNTER: Primary | ICD-10-CM

## 2022-08-03 LAB — BACTERIA SPEC AEROBE CULT: NO GROWTH

## 2022-08-03 PROCEDURE — 25010000002 MIDAZOLAM PER 1 MG: Performed by: ANESTHESIOLOGY

## 2022-08-03 PROCEDURE — 0 LIDOCAINE 1 % SOLUTION: Performed by: ANESTHESIOLOGY

## 2022-08-03 PROCEDURE — 25010000002 FENTANYL CITRATE (PF) 100 MCG/2ML SOLUTION: Performed by: ANESTHESIOLOGY

## 2022-08-03 PROCEDURE — 25010000002 ONDANSETRON PER 1 MG: Performed by: ANESTHESIOLOGY

## 2022-08-03 PROCEDURE — C1713 ANCHOR/SCREW BN/BN,TIS/BN: HCPCS | Performed by: ORTHOPAEDIC SURGERY

## 2022-08-03 PROCEDURE — 73070 X-RAY EXAM OF ELBOW: CPT

## 2022-08-03 PROCEDURE — C1769 GUIDE WIRE: HCPCS | Performed by: ORTHOPAEDIC SURGERY

## 2022-08-03 PROCEDURE — 25010000002 PROPOFOL 10 MG/ML EMULSION: Performed by: ANESTHESIOLOGY

## 2022-08-03 PROCEDURE — 25010000002 DEXAMETHASONE PER 1 MG: Performed by: ANESTHESIOLOGY

## 2022-08-03 PROCEDURE — 76000 FLUOROSCOPY <1 HR PHYS/QHP: CPT

## 2022-08-03 PROCEDURE — 76942 ECHO GUIDE FOR BIOPSY: CPT | Performed by: ORTHOPAEDIC SURGERY

## 2022-08-03 PROCEDURE — 25010000002 CEFAZOLIN PER 500 MG: Performed by: ORTHOPAEDIC SURGERY

## 2022-08-03 PROCEDURE — 25010000002 ROPIVACAINE PER 1 MG: Performed by: ANESTHESIOLOGY

## 2022-08-03 DEVICE — SCRW CANN SHRT THRD 1/3 4X44MM: Type: IMPLANTABLE DEVICE | Site: ELBOW | Status: FUNCTIONAL

## 2022-08-03 DEVICE — SCRW CANN SHRT THRD 1/3 4X38MM: Type: IMPLANTABLE DEVICE | Site: ELBOW | Status: FUNCTIONAL

## 2022-08-03 RX ORDER — PHENYLEPHRINE HCL IN 0.9% NACL 1 MG/10 ML
SYRINGE (ML) INTRAVENOUS AS NEEDED
Status: DISCONTINUED | OUTPATIENT
Start: 2022-08-03 | End: 2022-08-03 | Stop reason: SURG

## 2022-08-03 RX ORDER — FENTANYL CITRATE 50 UG/ML
INJECTION, SOLUTION INTRAMUSCULAR; INTRAVENOUS AS NEEDED
Status: DISCONTINUED | OUTPATIENT
Start: 2022-08-03 | End: 2022-08-03 | Stop reason: SURG

## 2022-08-03 RX ORDER — ONDANSETRON 2 MG/ML
INJECTION INTRAMUSCULAR; INTRAVENOUS AS NEEDED
Status: DISCONTINUED | OUTPATIENT
Start: 2022-08-03 | End: 2022-08-03 | Stop reason: SURG

## 2022-08-03 RX ORDER — MORPHINE SULFATE 4 MG/ML
2 INJECTION, SOLUTION INTRAMUSCULAR; INTRAVENOUS
Status: DISCONTINUED | OUTPATIENT
Start: 2022-08-03 | End: 2022-08-03 | Stop reason: HOSPADM

## 2022-08-03 RX ORDER — GLYCOPYRROLATE 0.2 MG/ML
INJECTION INTRAMUSCULAR; INTRAVENOUS AS NEEDED
Status: DISCONTINUED | OUTPATIENT
Start: 2022-08-03 | End: 2022-08-03 | Stop reason: SURG

## 2022-08-03 RX ORDER — SODIUM CHLORIDE, SODIUM LACTATE, POTASSIUM CHLORIDE, AND CALCIUM CHLORIDE .6; .31; .03; .02 G/100ML; G/100ML; G/100ML; G/100ML
20 INJECTION, SOLUTION INTRAVENOUS CONTINUOUS
Status: DISCONTINUED | OUTPATIENT
Start: 2022-08-03 | End: 2022-08-03 | Stop reason: HOSPADM

## 2022-08-03 RX ORDER — HYDROCODONE BITARTRATE AND ACETAMINOPHEN 10; 325 MG/1; MG/1
1 TABLET ORAL EVERY 4 HOURS PRN
Qty: 60 TABLET | Refills: 0 | Status: SHIPPED | OUTPATIENT
Start: 2022-08-03

## 2022-08-03 RX ORDER — DEXAMETHASONE SODIUM PHOSPHATE 4 MG/ML
INJECTION, SOLUTION INTRA-ARTICULAR; INTRALESIONAL; INTRAMUSCULAR; INTRAVENOUS; SOFT TISSUE
Status: COMPLETED | OUTPATIENT
Start: 2022-08-03 | End: 2022-08-03

## 2022-08-03 RX ORDER — PROPOFOL 10 MG/ML
VIAL (ML) INTRAVENOUS AS NEEDED
Status: DISCONTINUED | OUTPATIENT
Start: 2022-08-03 | End: 2022-08-03 | Stop reason: SURG

## 2022-08-03 RX ORDER — MIDAZOLAM HYDROCHLORIDE 1 MG/ML
INJECTION INTRAMUSCULAR; INTRAVENOUS
Status: COMPLETED | OUTPATIENT
Start: 2022-08-03 | End: 2022-08-03

## 2022-08-03 RX ORDER — ROPIVACAINE HYDROCHLORIDE 5 MG/ML
INJECTION, SOLUTION EPIDURAL; INFILTRATION; PERINEURAL
Status: COMPLETED | OUTPATIENT
Start: 2022-08-03 | End: 2022-08-03

## 2022-08-03 RX ORDER — LIDOCAINE HYDROCHLORIDE 10 MG/ML
INJECTION, SOLUTION INFILTRATION; PERINEURAL AS NEEDED
Status: DISCONTINUED | OUTPATIENT
Start: 2022-08-03 | End: 2022-08-03 | Stop reason: SURG

## 2022-08-03 RX ADMIN — FENTANYL CITRATE 100 MCG: 50 INJECTION, SOLUTION INTRAMUSCULAR; INTRAVENOUS at 08:19

## 2022-08-03 RX ADMIN — SUGAMMADEX 200 MG: 100 INJECTION, SOLUTION INTRAVENOUS at 09:11

## 2022-08-03 RX ADMIN — PROPOFOL 30 MG: 10 INJECTION, EMULSION INTRAVENOUS at 09:15

## 2022-08-03 RX ADMIN — Medication 100 MCG: at 08:50

## 2022-08-03 RX ADMIN — DEXAMETHASONE SODIUM PHOSPHATE 4 MG: 4 INJECTION, SOLUTION INTRAMUSCULAR; INTRAVENOUS at 07:55

## 2022-08-03 RX ADMIN — CEFAZOLIN 2 G: 2 INJECTION, POWDER, FOR SOLUTION INTRAMUSCULAR; INTRAVENOUS at 08:25

## 2022-08-03 RX ADMIN — PROPOFOL 200 MG: 10 INJECTION, EMULSION INTRAVENOUS at 08:19

## 2022-08-03 RX ADMIN — ROPIVACAINE HYDROCHLORIDE 30 ML: 5 INJECTION EPIDURAL; INFILTRATION; PERINEURAL at 07:55

## 2022-08-03 RX ADMIN — DEXAMETHASONE SODIUM PHOSPHATE 4 MG: 4 INJECTION, SOLUTION INTRAMUSCULAR; INTRAVENOUS at 09:11

## 2022-08-03 RX ADMIN — MIDAZOLAM 2 MG: 1 INJECTION INTRAMUSCULAR; INTRAVENOUS at 07:55

## 2022-08-03 RX ADMIN — Medication 100 MCG: at 08:41

## 2022-08-03 RX ADMIN — LIDOCAINE HYDROCHLORIDE 50 MG: 10 INJECTION, SOLUTION INFILTRATION; PERINEURAL at 08:19

## 2022-08-03 RX ADMIN — ONDANSETRON 4 MG: 2 INJECTION INTRAMUSCULAR; INTRAVENOUS at 09:11

## 2022-08-03 RX ADMIN — GLYCOPYRROLATE 0.2 MG: 0.2 INJECTION INTRAMUSCULAR; INTRAVENOUS at 08:19

## 2022-08-03 NOTE — ANESTHESIA PROCEDURE NOTES
Peripheral Block      Patient location during procedure: pre-op  Start time: 8/3/2022 7:50 AM  Stop time: 8/3/2022 7:55 AM  Reason for block: at surgeon's request and post-op pain management  Performed by  Anesthesiologist: Alexandro Gooden MD  Preanesthetic Checklist  Completed: patient identified, IV checked, site marked, risks and benefits discussed, surgical consent, monitors and equipment checked, pre-op evaluation and timeout performed  Prep:  Pt Position: supine  Sterile barriers:cap, gloves, sterile barriers, mask, partial drape and washed/disinfected hands  Prep: ChloraPrep  Patient monitoring: blood pressure monitoring, continuous pulse oximetry and EKG  Procedure    Sedation: yes  Performed under: local infiltration  Guidance:ultrasound guided    ULTRASOUND INTERPRETATION.  Using ultrasound guidance a 20 G gauge needle was placed in close proximity to the brachial plexus nerve, at which point, under ultrasound guidance anesthetic was injected in the area of the nerve and spread of the anesthesia was seen on ultrasound in close proximity thereto.  There were no abnormalities seen on ultrasound; a digital image was taken; and the patient tolerated the procedure with no complications. Images:still images obtained, printed/placed on chart    Laterality:left  Block Type:supraclavicular  Injection Technique:single-shot  Needle Type:echogenic  Needle Gauge:20 G    Sedation medications used: midazolam (VERSED) injection, 2 mg  Medications Used: dexamethasone (DECADRON) injection, 4 mg  ropivacaine (NAROPIN) 0.5 % injection, 30 mL  Med administered at 8/3/2022 7:55 AM      Post Assessment  Injection Assessment: negative aspiration for heme, no paresthesia on injection and incremental injection  Patient Tolerance:comfortable throughout block  Complications:no  Additional Notes  PT SEDATED YET AWAKE WITH MEANINGFUL CONVERSATION THROUGHOUT NO PAIN  OR PARESTHESIA WITH NEEDLE PLACMENT/INJECTION. US VERIFICATION OF  NEEDLE LOCATION/MEDICATION DISBURSEMENT. US GUIDED X1 WITH EASE

## 2022-08-03 NOTE — ANESTHESIA PROCEDURE NOTES
Airway  Urgency: elective    Date/Time: 8/3/2022 8:21 AM  End Time:8/3/2022 8:21 AM  Airway not difficult    General Information and Staff    Patient location during procedure: OR  Anesthesiologist: Alexandro Gooden MD    Indications and Patient Condition  Indications for airway management: airway protection    Preoxygenated: yes  MILS maintained throughout  Mask difficulty assessment: 1 - vent by mask    Final Airway Details  Final airway type: endotracheal airway      Successful airway: ETT  Cuffed: yes   Successful intubation technique: direct laryngoscopy  Endotracheal tube insertion site: oral  Blade: Cheryl  Blade size: 3  ETT size (mm): 7.0  Cormack-Lehane Classification: grade I - full view of glottis  Placement verified by: chest auscultation and capnometry   Measured from: teeth  ETT/EBT  to teeth (cm): 21  Number of attempts at approach: 1  Assessment: lips, teeth, and gum same as pre-op and atraumatic intubation    Additional Comments  X1 UDVC. ATRAUMATIC.  TEETH IN PREOP CONDITION.  CUFF TO MINIMUM OCCLUSIVE CUFF PRESSURE. MOCP. POS ETCO2. GAUZE BITE BLOCK

## 2022-08-03 NOTE — ANESTHESIA POSTPROCEDURE EVALUATION
Patient: Latonya Lowe    Procedure Summary     Date: 08/03/22 Room / Location: [facility] OR 06 / [facility] MAIN OR    Anesthesia Start: 0817 Anesthesia Stop: 0932    Procedure: ELBOW OPEN REDUCTION INTERNAL FIXATION (Left Elbow) Diagnosis:       Left elbow fracture      (Left elbow fracture [S42.402A])    Surgeons: Sandor Jang MD Provider: Alexandro Gooden MD    Anesthesia Type: general with block ASA Status: 2          Anesthesia Type: general with block    Vitals  Vitals Value Taken Time   /68 08/03/22 1030   Temp 97 °F (36.1 °C) 08/03/22 1030   Pulse 86 08/03/22 1034   Resp     SpO2 93 % 08/03/22 1034   Vitals shown include unvalidated device data.        Post Anesthesia Care and Evaluation    Patient location during evaluation: PACU  Patient participation: complete - patient participated  Level of consciousness: awake  Pain scale: See nurse's notes for pain score.  Pain management: adequate    Airway patency: patent  Anesthetic complications: No anesthetic complications  PONV Status: none  Cardiovascular status: acceptable  Respiratory status: acceptable  Hydration status: acceptable    Comments: Patient seen and examined postoperatively; vital signs stable; SpO2 greater than or equal to 90%; cardiopulmonary status stable; nausea/vomiting adequately controlled; pain adequately controlled; no apparent anesthesia complications; patient discharged from anesthesia care when discharge criteria were met

## 2022-08-03 NOTE — OP NOTE
Preoperative diagnosis lateral condylar fracture with extension into the medial condyle of the left distal humerus with possible fracture of the radial head  Postoperative diagnosis same  Surgeon Dr. Jang  Assistant Stephen  Anesthesia General by Dr. Gooden supplemented with a regional  Complications drains transfusions none.  Estimated blood loss less than 50 cc.  Tourniquet time none.    Operation performed closed reduction internal fixation left supracondylar fracture    Indications this patient is a 57-year-old female with fell and injured her left elbow 2 days ago.  Diagnosis and treatment plan as well as risk and potential complications were discussed with her that include but not exclusive to continued pain, infection, nerve damage, artery damage.  She understood this and still does not ambulate the procedure.    Procedure the patient was taken the operating room where she was given a general anesthesia regional block placed in the supine position her left arm was prepped and draped in usual sterile manner.  She was given 2 g of perioperative Kefzol and a proper timeout was performed.    Using C arm guidance a guidepin was inserted into the lateral condyle of her distal humerus and was found to be in the appropriate position in AP and lateral positions.  Then using a 4.0 cannulated drill set appropriate holes were made in the supracondylar area in the distal humerus and the appropriate size screws were used to fix the condyle and the intercondylar extension of the fracture.  X-rays were taken which showed appropriate placement of the screws and good reduction of the fracture.  Patient had excellent capillary refill post procedure.  The small 1 cm incision was washed out with copious amounts of sterile saline and the skin was reapproximated with interrupted sutures of 4-0 nylon.  Then the wound was covered with Telfa sterile gauze and the patient was placed in a long-arm cast with the elbow supinated to 90  degrees and the elbow in 90 degrees of flexion.  The needle and sponge count was correct at the end of the procedure the patient tolerated procedure well was taken to the recovery in satisfactory condition.  She was placed in a sling.  She was also given hydrocodone 10/325 #60 and was to be seen in the office in 3 weeks or sooner if she had problems.

## 2022-08-03 NOTE — H&P
[facility]   HISTORY AND PHYSICAL    Patient Name: Latonya Lowe  : 1965  MRN: [id number]  Primary Care Physician:  Babs Andrade MD  Date of admission: 8/3/2022    Subjective   Subjective     Chief Complaint: Pain in left elbow    History of Present Illness  This patient is a 57-year-old right-handed female who fell 2 days ago while babysitting for her great niece.  She had an isolated injury to her left elbow.  No loss of consciousness neck or back problems.  She went to an urgent care center and was treated and referred here.  Review of Systems   Negative  Personal History     Past Medical History:   Diagnosis Date   • Cervical dysplasia    • GERD (gastroesophageal reflux disease)    • History of varicose veins    • Hypertension        Past Surgical History:   Procedure Laterality Date   •  SECTION     • ECTOPIC PREGNANCY SURGERY     • HYSTERECTOMY      CERVICAL DYSPLASIA  SEES DR MOODY (GYN)       Family History: family history includes Esophageal cancer in her father and another family member; Hip fracture in her mother; Hyperlipidemia in her father; Hypertension in an other family member. Otherwise pertinent FHx was reviewed and not pertinent to current issue.    Social History:  reports that she has never smoked. She has never used smokeless tobacco. She reports current alcohol use of about 10.0 standard drinks of alcohol per week. She reports that she does not use drugs.    Home Medications:  HYDROcodone-acetaminophen, Vitamin D (Cholecalciferol), amLODIPine, famotidine, losartan, and omeprazole    Allergies:  No Known Allergies    Objective    Objective     Vitals:        Physical Exam    Result Review    Result Review:  I have personally reviewed the results from the time of this admission to 8/3/2022 06:59 EDT and agree with these findings:  []  Laboratory list / accordion  []  Microbiology  []  Radiology  []  EKG/Telemetry   []  Cardiology/Vascular   []  Pathology  []  Old  records  []  Other:  Most notable findings include: She is with her .  Oriented to time place and person.  Head normocephalic and atraumatic  Eyes pupils equal round reactive to light and accommodation  Neck supple without adenopathy  Cardiovascular regular rate and rhythm normal S1 and S2  Lungs clear to auscultation percussion  Abdomen soft nontender nondistended bowel sounds within normal notes no mass or fluid wave  Extremities no cyanosis clubbing or edema deep tendon reflexes within normal limits.  X-rays reveal a displaced lateral condylar fracture of her left humerus with extension into the intercondylar notch and possible radial head fracture      Assessment & Plan   Assessment / Plan     Brief Patient Summary:  Latonya Lowe is a 57 y.o. female who fell and injured her nondominant left elbow.    Active Hospital Problems:  There are no active hospital problems to display for this patient.    Plan: Open reduction internal fixation left elbow as an outpatient.    DVT prophylaxis:  No DVT prophylaxis order currently exists.    CODE STATUS:       Admission Status:  I believe this patient meets outpatient status.    Sandor Jang MD

## 2022-08-04 LAB — QT INTERVAL: 385 MS

## 2022-12-09 ENCOUNTER — TRANSCRIBE ORDERS (OUTPATIENT)
Dept: ADMINISTRATIVE | Facility: HOSPITAL | Age: 57
End: 2022-12-09

## 2022-12-09 ENCOUNTER — LAB (OUTPATIENT)
Dept: LAB | Facility: HOSPITAL | Age: 57
End: 2022-12-09

## 2022-12-09 ENCOUNTER — HOSPITAL ENCOUNTER (OUTPATIENT)
Dept: CARDIOLOGY | Facility: HOSPITAL | Age: 57
Discharge: HOME | End: 2022-12-09

## 2022-12-09 DIAGNOSIS — Z01.818 PRE-OP TESTING: Primary | ICD-10-CM

## 2022-12-09 DIAGNOSIS — Z01.818 PREOP TESTING: ICD-10-CM

## 2022-12-09 DIAGNOSIS — Z01.818 PRE-OP TESTING: ICD-10-CM

## 2022-12-09 LAB
ANION GAP SERPL CALCULATED.3IONS-SCNC: 9.5 MMOL/L (ref 5–15)
BASOPHILS # BLD AUTO: 0.06 10*3/MM3 (ref 0–0.2)
BASOPHILS NFR BLD AUTO: 1 % (ref 0–1.5)
BUN SERPL-MCNC: 13 MG/DL (ref 6–20)
BUN/CREAT SERPL: 16.7 (ref 7–25)
CALCIUM SPEC-SCNC: 9.5 MG/DL (ref 8.6–10.5)
CHLORIDE SERPL-SCNC: 102 MMOL/L (ref 98–107)
CO2 SERPL-SCNC: 26.5 MMOL/L (ref 22–29)
CREAT SERPL-MCNC: 0.78 MG/DL (ref 0.57–1)
DEPRECATED RDW RBC AUTO: 41.9 FL (ref 37–54)
EGFRCR SERPLBLD CKD-EPI 2021: 88.2 ML/MIN/1.73
EOSINOPHIL # BLD AUTO: 0.16 10*3/MM3 (ref 0–0.4)
EOSINOPHIL NFR BLD AUTO: 2.8 % (ref 0.3–6.2)
ERYTHROCYTE [DISTWIDTH] IN BLOOD BY AUTOMATED COUNT: 12.6 % (ref 12.3–15.4)
GLUCOSE SERPL-MCNC: 95 MG/DL (ref 65–99)
HCT VFR BLD AUTO: 40.4 % (ref 34–46.6)
HGB BLD-MCNC: 13.9 G/DL (ref 12–15.9)
IMM GRANULOCYTES # BLD AUTO: 0.01 10*3/MM3 (ref 0–0.05)
IMM GRANULOCYTES NFR BLD AUTO: 0.2 % (ref 0–0.5)
LYMPHOCYTES # BLD AUTO: 2.74 10*3/MM3 (ref 0.7–3.1)
LYMPHOCYTES NFR BLD AUTO: 47.2 % (ref 19.6–45.3)
MCH RBC QN AUTO: 31.4 PG (ref 26.6–33)
MCHC RBC AUTO-ENTMCNC: 34.4 G/DL (ref 31.5–35.7)
MCV RBC AUTO: 91.2 FL (ref 79–97)
MONOCYTES # BLD AUTO: 0.48 10*3/MM3 (ref 0.1–0.9)
MONOCYTES NFR BLD AUTO: 8.3 % (ref 5–12)
NEUTROPHILS NFR BLD AUTO: 2.35 10*3/MM3 (ref 1.7–7)
NEUTROPHILS NFR BLD AUTO: 40.5 % (ref 42.7–76)
NRBC BLD AUTO-RTO: 0 /100 WBC (ref 0–0.2)
PLATELET # BLD AUTO: 357 10*3/MM3 (ref 140–450)
PMV BLD AUTO: 8.7 FL (ref 6–12)
POTASSIUM SERPL-SCNC: 4.2 MMOL/L (ref 3.5–5.2)
QT INTERVAL: 372 MS
RBC # BLD AUTO: 4.43 10*6/MM3 (ref 3.77–5.28)
SODIUM SERPL-SCNC: 138 MMOL/L (ref 136–145)
WBC NRBC COR # BLD: 5.8 10*3/MM3 (ref 3.4–10.8)

## 2022-12-09 PROCEDURE — 85025 COMPLETE CBC W/AUTO DIFF WBC: CPT

## 2022-12-09 PROCEDURE — 93010 ELECTROCARDIOGRAM REPORT: CPT | Performed by: INTERNAL MEDICINE

## 2022-12-09 PROCEDURE — 93005 ELECTROCARDIOGRAM TRACING: CPT | Performed by: ORTHOPAEDIC SURGERY

## 2022-12-09 PROCEDURE — 80048 BASIC METABOLIC PNL TOTAL CA: CPT

## 2022-12-09 PROCEDURE — 36415 COLL VENOUS BLD VENIPUNCTURE: CPT

## 2023-03-02 PROBLEM — K63.5 POLYP OF COLON: Status: ACTIVE | Noted: 2022-03-09

## 2023-05-23 ENCOUNTER — ON CAMPUS - OUTPATIENT (AMBULATORY)
Dept: URBAN - METROPOLITAN AREA HOSPITAL 2 | Facility: HOSPITAL | Age: 58
End: 2023-05-23

## 2023-05-23 VITALS
SYSTOLIC BLOOD PRESSURE: 116 MMHG | SYSTOLIC BLOOD PRESSURE: 112 MMHG | DIASTOLIC BLOOD PRESSURE: 100 MMHG | HEART RATE: 71 BPM | RESPIRATION RATE: 16 BRPM | WEIGHT: 146 LBS | HEART RATE: 75 BPM | OXYGEN SATURATION: 97 % | SYSTOLIC BLOOD PRESSURE: 124 MMHG | DIASTOLIC BLOOD PRESSURE: 76 MMHG | TEMPERATURE: 98 F | DIASTOLIC BLOOD PRESSURE: 72 MMHG | DIASTOLIC BLOOD PRESSURE: 70 MMHG | RESPIRATION RATE: 14 BRPM | OXYGEN SATURATION: 99 % | OXYGEN SATURATION: 98 % | SYSTOLIC BLOOD PRESSURE: 121 MMHG | DIASTOLIC BLOOD PRESSURE: 79 MMHG | RESPIRATION RATE: 20 BRPM | RESPIRATION RATE: 13 BRPM | SYSTOLIC BLOOD PRESSURE: 107 MMHG | DIASTOLIC BLOOD PRESSURE: 74 MMHG | SYSTOLIC BLOOD PRESSURE: 144 MMHG | HEIGHT: 64 IN | HEART RATE: 62 BPM | HEART RATE: 68 BPM | SYSTOLIC BLOOD PRESSURE: 118 MMHG | DIASTOLIC BLOOD PRESSURE: 81 MMHG | DIASTOLIC BLOOD PRESSURE: 78 MMHG | RESPIRATION RATE: 18 BRPM | HEART RATE: 73 BPM | HEART RATE: 74 BPM

## 2023-05-23 DIAGNOSIS — Z86.010 PERSONAL HISTORY OF COLONIC POLYPS: ICD-10-CM

## 2023-05-23 DIAGNOSIS — K63.89 OTHER SPECIFIED DISEASES OF INTESTINE: ICD-10-CM

## 2023-05-23 PROCEDURE — 45378 DIAGNOSTIC COLONOSCOPY: CPT | Mod: 33 | Performed by: INTERNAL MEDICINE

## (undated) DEVICE — PAD CAST SYN PROTOUCH RL 3IN NS

## (undated) DEVICE — GOWN,REINFORCE,POLY,SIRUS,BREATH SLV,XLG: Brand: MEDLINE

## (undated) DEVICE — GLV SURG SIGNATURE ESSENTIAL PF LTX SZ8.5

## (undated) DEVICE — APPL CHLORAPREP HI/LITE TINTED 10.5ML ORNG

## (undated) DEVICE — SCRW CANN SHT THRD 1/3 4X28MM
Type: IMPLANTABLE DEVICE | Site: ELBOW | Status: NON-FUNCTIONAL
Removed: 2022-08-03

## (undated) DEVICE — GAUZE,SPONGE,FLUFF,6"X6.75",STRL,5/TRAY: Brand: MEDLINE

## (undated) DEVICE — CUFF TOURNI 1BLADDER 1PRT 18IN STRL

## (undated) DEVICE — PK EXTREM 50

## (undated) DEVICE — SOLUTION,WATER,IRRIGATION,1000ML,STERILE: Brand: MEDLINE

## (undated) DEVICE — GW THRD 1.25X150MM FOR 3.5 4MM CANN SCRW

## (undated) DEVICE — KT SURG TURNOVER 050

## (undated) DEVICE — INTENDED FOR TISSUE SEPARATION, AND OTHER PROCEDURES THAT REQUIRE A SHARP SURGICAL BLADE TO PUNCTURE OR CUT.: Brand: BARD-PARKER ® CARBON RIB-BACK BLADES

## (undated) DEVICE — 3M™ STERI-DRAPE™ U-DRAPE 1015: Brand: STERI-DRAPE™

## (undated) DEVICE — TBG PENCL TELESCP MEGADYNE SMOKE EVAC 15FT

## (undated) DEVICE — DRSNG TELFA PAD NONADH STR 1S 3X8IN

## (undated) DEVICE — GLV SURG DERMASSURE GRN LF PF 8.5

## (undated) DEVICE — Device

## (undated) DEVICE — PAD CAST SYN PROTOUCH RL 4IN NS

## (undated) DEVICE — PAD CAST SYN PROTOUCH RL 2IN NS

## (undated) DEVICE — SUT ETHLN FS1 4/0 18IN 1629H BX/36